# Patient Record
Sex: FEMALE | Race: WHITE | NOT HISPANIC OR LATINO
[De-identification: names, ages, dates, MRNs, and addresses within clinical notes are randomized per-mention and may not be internally consistent; named-entity substitution may affect disease eponyms.]

---

## 2017-01-19 ENCOUNTER — APPOINTMENT (OUTPATIENT)
Dept: CARDIOLOGY | Facility: CLINIC | Age: 72
End: 2017-01-19

## 2017-01-21 ENCOUNTER — TRANSCRIPTION ENCOUNTER (OUTPATIENT)
Age: 72
End: 2017-01-21

## 2017-05-22 ENCOUNTER — INPATIENT (INPATIENT)
Facility: HOSPITAL | Age: 72
LOS: 1 days | Discharge: HOME | DRG: 190 | End: 2017-05-24
Attending: INTERNAL MEDICINE | Admitting: INTERNAL MEDICINE
Payer: MEDICARE

## 2017-05-22 VITALS
HEART RATE: 115 BPM | WEIGHT: 93.04 LBS | RESPIRATION RATE: 23 BRPM | TEMPERATURE: 100 F | HEIGHT: 65 IN | DIASTOLIC BLOOD PRESSURE: 78 MMHG | OXYGEN SATURATION: 93 % | SYSTOLIC BLOOD PRESSURE: 174 MMHG

## 2017-05-22 DIAGNOSIS — J44.1 CHRONIC OBSTRUCTIVE PULMONARY DISEASE WITH (ACUTE) EXACERBATION: ICD-10-CM

## 2017-05-22 DIAGNOSIS — R50.9 FEVER, UNSPECIFIED: ICD-10-CM

## 2017-05-22 DIAGNOSIS — Z29.9 ENCOUNTER FOR PROPHYLACTIC MEASURES, UNSPECIFIED: ICD-10-CM

## 2017-05-22 DIAGNOSIS — J96.01 ACUTE RESPIRATORY FAILURE WITH HYPOXIA: ICD-10-CM

## 2017-05-22 DIAGNOSIS — I10 ESSENTIAL (PRIMARY) HYPERTENSION: ICD-10-CM

## 2017-05-22 DIAGNOSIS — K21.9 GASTRO-ESOPHAGEAL REFLUX DISEASE WITHOUT ESOPHAGITIS: ICD-10-CM

## 2017-05-22 LAB
ALBUMIN SERPL ELPH-MCNC: 2.8 G/DL — LOW (ref 3.3–5)
ALP SERPL-CCNC: 86 U/L — SIGNIFICANT CHANGE UP (ref 30–120)
ALT FLD-CCNC: 28 U/L DA — SIGNIFICANT CHANGE UP (ref 10–60)
ANION GAP SERPL CALC-SCNC: 8 MMOL/L — SIGNIFICANT CHANGE UP (ref 5–17)
APPEARANCE UR: CLEAR — SIGNIFICANT CHANGE UP
APTT BLD: 30.1 SEC — SIGNIFICANT CHANGE UP (ref 27.5–37.4)
AST SERPL-CCNC: 24 U/L — SIGNIFICANT CHANGE UP (ref 10–40)
BASE EXCESS BLDA CALC-SCNC: 2.1 MMOL/L — HIGH (ref -2–2)
BILIRUB SERPL-MCNC: 0.4 MG/DL — SIGNIFICANT CHANGE UP (ref 0.2–1.2)
BILIRUB UR-MCNC: NEGATIVE — SIGNIFICANT CHANGE UP
BLOOD GAS SOURCE: SIGNIFICANT CHANGE UP
BUN SERPL-MCNC: 17 MG/DL — SIGNIFICANT CHANGE UP (ref 7–23)
CALCIUM SERPL-MCNC: 9.3 MG/DL — SIGNIFICANT CHANGE UP (ref 8.4–10.5)
CHLORIDE SERPL-SCNC: 97 MMOL/L — SIGNIFICANT CHANGE UP (ref 96–108)
CO2 SERPL-SCNC: 30 MMOL/L — SIGNIFICANT CHANGE UP (ref 22–31)
COLOR SPEC: YELLOW — SIGNIFICANT CHANGE UP
CREAT SERPL-MCNC: 0.81 MG/DL — SIGNIFICANT CHANGE UP (ref 0.5–1.3)
DIFF PNL FLD: ABNORMAL
GLUCOSE SERPL-MCNC: 89 MG/DL — SIGNIFICANT CHANGE UP (ref 70–99)
GLUCOSE UR QL: NEGATIVE MG/DL — SIGNIFICANT CHANGE UP
HCO3 BLDA-SCNC: 27 MMOL/L — SIGNIFICANT CHANGE UP (ref 21–29)
HCT VFR BLD CALC: 42.5 % — SIGNIFICANT CHANGE UP (ref 34.5–45)
HGB BLD-MCNC: 14 G/DL — SIGNIFICANT CHANGE UP (ref 11.5–15.5)
HOROWITZ INDEX BLDA+IHG-RTO: 21 — SIGNIFICANT CHANGE UP
INR BLD: 1.19 RATIO — HIGH (ref 0.88–1.16)
KETONES UR-MCNC: ABNORMAL
LACTATE SERPL-SCNC: 1.3 MMOL/L — SIGNIFICANT CHANGE UP (ref 0.7–2)
LEUKOCYTE ESTERASE UR-ACNC: ABNORMAL
LIDOCAIN IGE QN: 72 U/L — LOW (ref 73–393)
LYMPHOCYTES # BLD AUTO: 23 % — SIGNIFICANT CHANGE UP (ref 13–44)
MAGNESIUM SERPL-MCNC: 1.6 MG/DL — SIGNIFICANT CHANGE UP (ref 1.6–2.6)
MCHC RBC-ENTMCNC: 28.2 PG — SIGNIFICANT CHANGE UP (ref 27–34)
MCHC RBC-ENTMCNC: 33 GM/DL — SIGNIFICANT CHANGE UP (ref 32–36)
MCV RBC AUTO: 85.4 FL — SIGNIFICANT CHANGE UP (ref 80–100)
MONOCYTES NFR BLD AUTO: 4 % — SIGNIFICANT CHANGE UP (ref 2–14)
NEUTROPHILS NFR BLD AUTO: 72 % — SIGNIFICANT CHANGE UP (ref 43–77)
NITRITE UR-MCNC: POSITIVE
PCO2 BLDA: 33 MMHG — SIGNIFICANT CHANGE UP (ref 32–46)
PH BLD: 7.49 — HIGH (ref 7.35–7.45)
PH UR: 6.5 — SIGNIFICANT CHANGE UP (ref 5–8)
PHOSPHATE SERPL-MCNC: 3.1 MG/DL — SIGNIFICANT CHANGE UP (ref 2.5–4.5)
PLATELET # BLD AUTO: 353 K/UL — SIGNIFICANT CHANGE UP (ref 150–400)
PO2 BLDA: 54 MMHG — LOW (ref 74–108)
POTASSIUM SERPL-MCNC: 3.8 MMOL/L — SIGNIFICANT CHANGE UP (ref 3.5–5.3)
POTASSIUM SERPL-SCNC: 3.8 MMOL/L — SIGNIFICANT CHANGE UP (ref 3.5–5.3)
PROT SERPL-MCNC: 6.2 G/DL — SIGNIFICANT CHANGE UP (ref 6–8.3)
PROT UR-MCNC: 30 MG/DL
PROTHROM AB SERPL-ACNC: 13 SEC — HIGH (ref 9.8–12.7)
RBC # BLD: 4.98 M/UL — SIGNIFICANT CHANGE UP (ref 3.8–5.2)
RBC # FLD: 13.9 % — SIGNIFICANT CHANGE UP (ref 10.3–14.5)
SAO2 % BLDA: 89 % — LOW (ref 92–96)
SODIUM SERPL-SCNC: 135 MMOL/L — SIGNIFICANT CHANGE UP (ref 135–145)
SP GR SPEC: 1.01 — SIGNIFICANT CHANGE UP (ref 1.01–1.02)
UROBILINOGEN FLD QL: NEGATIVE MG/DL — SIGNIFICANT CHANGE UP
WBC # BLD: 22.2 K/UL — HIGH (ref 3.8–10.5)
WBC # FLD AUTO: 22.2 K/UL — HIGH (ref 3.8–10.5)

## 2017-05-22 PROCEDURE — 99285 EMERGENCY DEPT VISIT HI MDM: CPT

## 2017-05-22 PROCEDURE — 93010 ELECTROCARDIOGRAM REPORT: CPT

## 2017-05-22 PROCEDURE — 71010: CPT | Mod: 26

## 2017-05-22 RX ORDER — IPRATROPIUM/ALBUTEROL SULFATE 18-103MCG
3 AEROSOL WITH ADAPTER (GRAM) INHALATION EVERY 4 HOURS
Qty: 0 | Refills: 0 | Status: DISCONTINUED | OUTPATIENT
Start: 2017-05-22 | End: 2017-05-24

## 2017-05-22 RX ORDER — BUDESONIDE AND FORMOTEROL FUMARATE DIHYDRATE 160; 4.5 UG/1; UG/1
2 AEROSOL RESPIRATORY (INHALATION)
Qty: 0 | Refills: 0 | Status: DISCONTINUED | OUTPATIENT
Start: 2017-05-22 | End: 2017-05-24

## 2017-05-22 RX ORDER — TIOTROPIUM BROMIDE 18 UG/1
2 CAPSULE ORAL; RESPIRATORY (INHALATION)
Qty: 0 | Refills: 0 | COMMUNITY

## 2017-05-22 RX ORDER — ZOLPIDEM TARTRATE 10 MG/1
1 TABLET ORAL
Qty: 0 | Refills: 0 | COMMUNITY

## 2017-05-22 RX ORDER — TIOTROPIUM BROMIDE 18 UG/1
1 CAPSULE ORAL; RESPIRATORY (INHALATION) DAILY
Qty: 0 | Refills: 0 | Status: DISCONTINUED | OUTPATIENT
Start: 2017-05-22 | End: 2017-05-24

## 2017-05-22 RX ORDER — AZTREONAM 2 G
1000 VIAL (EA) INJECTION EVERY 8 HOURS
Qty: 0 | Refills: 0 | Status: DISCONTINUED | OUTPATIENT
Start: 2017-05-22 | End: 2017-05-23

## 2017-05-22 RX ORDER — ACETAMINOPHEN 500 MG
650 TABLET ORAL ONCE
Qty: 0 | Refills: 0 | Status: COMPLETED | OUTPATIENT
Start: 2017-05-22 | End: 2017-05-22

## 2017-05-22 RX ORDER — BUDESONIDE AND FORMOTEROL FUMARATE DIHYDRATE 160; 4.5 UG/1; UG/1
2 AEROSOL RESPIRATORY (INHALATION)
Qty: 0 | Refills: 0 | COMMUNITY

## 2017-05-22 RX ORDER — ACETAMINOPHEN 500 MG
650 TABLET ORAL EVERY 6 HOURS
Qty: 0 | Refills: 0 | Status: DISCONTINUED | OUTPATIENT
Start: 2017-05-22 | End: 2017-05-24

## 2017-05-22 RX ORDER — SIMVASTATIN 20 MG/1
1 TABLET, FILM COATED ORAL
Qty: 0 | Refills: 0 | COMMUNITY

## 2017-05-22 RX ORDER — ENOXAPARIN SODIUM 100 MG/ML
30 INJECTION SUBCUTANEOUS EVERY 24 HOURS
Qty: 0 | Refills: 0 | Status: DISCONTINUED | OUTPATIENT
Start: 2017-05-22 | End: 2017-05-24

## 2017-05-22 RX ORDER — SODIUM CHLORIDE 9 MG/ML
1000 INJECTION INTRAMUSCULAR; INTRAVENOUS; SUBCUTANEOUS ONCE
Qty: 0 | Refills: 0 | Status: COMPLETED | OUTPATIENT
Start: 2017-05-22 | End: 2017-05-22

## 2017-05-22 RX ORDER — SIMVASTATIN 20 MG/1
10 TABLET, FILM COATED ORAL AT BEDTIME
Qty: 0 | Refills: 0 | Status: DISCONTINUED | OUTPATIENT
Start: 2017-05-22 | End: 2017-05-24

## 2017-05-22 RX ORDER — CEFTRIAXONE 500 MG/1
1 INJECTION, POWDER, FOR SOLUTION INTRAMUSCULAR; INTRAVENOUS ONCE
Qty: 0 | Refills: 0 | Status: DISCONTINUED | OUTPATIENT
Start: 2017-05-22 | End: 2017-05-22

## 2017-05-22 RX ORDER — ZOLPIDEM TARTRATE 10 MG/1
5 TABLET ORAL AT BEDTIME
Qty: 0 | Refills: 0 | Status: DISCONTINUED | OUTPATIENT
Start: 2017-05-22 | End: 2017-05-24

## 2017-05-22 RX ORDER — AMLODIPINE BESYLATE 2.5 MG/1
2.5 TABLET ORAL EVERY 24 HOURS
Qty: 0 | Refills: 0 | Status: DISCONTINUED | OUTPATIENT
Start: 2017-05-22 | End: 2017-05-24

## 2017-05-22 RX ORDER — AZITHROMYCIN 500 MG/1
500 TABLET, FILM COATED ORAL ONCE
Qty: 0 | Refills: 0 | Status: DISCONTINUED | OUTPATIENT
Start: 2017-05-22 | End: 2017-05-22

## 2017-05-22 RX ORDER — IPRATROPIUM/ALBUTEROL SULFATE 18-103MCG
3 AEROSOL WITH ADAPTER (GRAM) INHALATION EVERY 6 HOURS
Qty: 0 | Refills: 0 | Status: DISCONTINUED | OUTPATIENT
Start: 2017-05-22 | End: 2017-05-24

## 2017-05-22 RX ORDER — LACTOBACILLUS ACIDOPHILUS 100MM CELL
1 CAPSULE ORAL
Qty: 0 | Refills: 0 | Status: DISCONTINUED | OUTPATIENT
Start: 2017-05-22 | End: 2017-05-24

## 2017-05-22 RX ORDER — VANCOMYCIN HCL 1 G
1000 VIAL (EA) INTRAVENOUS ONCE
Qty: 0 | Refills: 0 | Status: COMPLETED | OUTPATIENT
Start: 2017-05-22 | End: 2017-05-22

## 2017-05-22 RX ORDER — AZTREONAM 2 G
1000 VIAL (EA) INJECTION ONCE
Qty: 0 | Refills: 0 | Status: COMPLETED | OUTPATIENT
Start: 2017-05-22 | End: 2017-05-22

## 2017-05-22 RX ORDER — FAMOTIDINE 10 MG/ML
20 INJECTION INTRAVENOUS DAILY
Qty: 0 | Refills: 0 | Status: DISCONTINUED | OUTPATIENT
Start: 2017-05-22 | End: 2017-05-24

## 2017-05-22 RX ADMIN — Medication 125 MILLIGRAM(S): at 18:00

## 2017-05-22 RX ADMIN — Medication 50 MILLIGRAM(S): at 17:55

## 2017-05-22 RX ADMIN — Medication 650 MILLIGRAM(S): at 18:01

## 2017-05-22 RX ADMIN — Medication 250 MILLIGRAM(S): at 18:25

## 2017-05-22 RX ADMIN — Medication 3 MILLILITER(S): at 20:25

## 2017-05-22 RX ADMIN — SODIUM CHLORIDE 1000 MILLILITER(S): 9 INJECTION INTRAMUSCULAR; INTRAVENOUS; SUBCUTANEOUS at 17:55

## 2017-05-22 RX ADMIN — SIMVASTATIN 10 MILLIGRAM(S): 20 TABLET, FILM COATED ORAL at 21:21

## 2017-05-22 NOTE — H&P ADULT - PROBLEM SELECTOR PLAN 3
Patient with questionable pneumonia.  Will place on IV antibiotics.  Follow Chest x-ray results.  Further management as per patient's clinical course.

## 2017-05-22 NOTE — H&P ADULT - PMH
Chronic obstructive pulmonary disease, unspecified COPD type    Essential hypertension    Gastroesophageal reflux disease, esophagitis presence not specified    Hyperlipidemia, unspecified hyperlipidemia type

## 2017-05-22 NOTE — H&P ADULT - NSHPSOCIALHISTORY_GEN_ALL_CORE
, lives with .  Quit smoking 2 years ago.   Smoked 3/4 pack per day for more than 50 years.   Denies any alcohol abuse.

## 2017-05-22 NOTE — ED PROVIDER NOTE - CONSTITUTIONAL, MLM
normal... thin, awake, alert, oriented to person, place, time/situation and in no apparent distress.

## 2017-05-22 NOTE — H&P ADULT - NSHPLABSRESULTS_GEN_ALL_CORE
14.0   22.2  )-----------( 353      ( 22 May 2017 17:44 )             42.5     05-22    135  |  97  |  17  ----------------------------<  89  3.8   |  30  |  0.81    Ca    9.3      22 May 2017 17:44    TPro  6.2  /  Alb  2.8<L>  /  TBili  0.4  /  DBili  x   /  AST  24  /  ALT  28  /  AlkPhos  86  05-22    CAPILLARY BLOOD GLUCOSE    PT/INR - ( 22 May 2017 17:44 )   PT: 13.0 sec;   INR: 1.19 ratio         PTT - ( 22 May 2017 17:44 )  PTT:30.1 sec    ABG - ( 22 May 2017 18:11 )  pH: x     /  pCO2: 33    /  pO2: 54    / HCO3: 27    / Base Excess: 2.1   /  SaO2: 89

## 2017-05-22 NOTE — ED PROVIDER NOTE - DETAILS:
Singh Chance D.O. - The scribe's documentation has been prepared under my direction and personally reviewed by me in its entirety. I confirm that the note above accurately reflects all work, treatment, procedures, and medical decision making performed by me.

## 2017-05-22 NOTE — H&P ADULT - HISTORY OF PRESENT ILLNESS
72-year-old female who has past medical history of COPD, hypertension, GERD, who has been having increasing shortness of breath for last few weeks.  Patient was treated as outpatient with oral steroids and nebulizer.  As she continued to have increasing shortness of breath, she was seen in Dr. Andrade's office today.  Patient was noted to have fever.  She was also noted to have COPD exacerbation.  Patient was advised to come to the emergency room for possible admission.    Patient denies any associated chest pain or chest pressure.  She denies any orthopnea or PND.  She denies any nausea or vomiting.    Patient states she has a cough that's productive of small amount of sputum.  She denies any dizziness or lightheadedness. Patient complains of being weak and tired.

## 2017-05-22 NOTE — ED ADULT NURSE REASSESSMENT NOTE - COMFORT CARE
warm blanket provided/assisted to bathroom/ambulated to bathroom/treatment delay explained/wait time explained

## 2017-05-22 NOTE — H&P ADULT - NSHPREVIEWOFSYSTEMS_GEN_ALL_CORE
REVIEW OF SYSTEMS:  CONSTITUTIONAL: + fever, + fatigue  EYES: No eye pain, visual disturbances, or discharge  ENMT:  No difficulty hearing, tinnitus, vertigo; No sinus or throat pain  NECK: No pain or stiffness  BREASTS: No pain, masses, or nipple discharge  RESPIRATORY: + cough,+ wheezing, + shortness of breath  CARDIOVASCULAR: No chest pain, palpitations, dizziness, or leg swelling  GASTROINTESTINAL: No abdominal or epigastric pain. No nausea, vomiting, or hematemesis; No diarrhea or constipation. No melena or hematochezia.  GENITOURINARY: No dysuria, frequency, hematuria, or incontinence  NEUROLOGICAL: No headaches, memory loss, loss of strength, numbness, or tremors  SKIN: No itching, burning, rashes, or lesions   LYMPH NODES: No enlarged glands  ENDOCRINE: No heat or cold intolerance; No hair loss; No polydipsia or polyuria  MUSCULOSKELETAL: No joint pain or swelling; No muscle, back, or extremity pain  PSYCHIATRIC: No depression, anxiety, mood swings, or difficulty sleeping  HEME/LYMPH: No easy bruising, or bleeding gums  ALLERGY AND IMMUNOLOGIC: No hives or eczema

## 2017-05-22 NOTE — H&P ADULT - ASSESSMENT
72-year-old female who has past medical history of COPD, hypertension, GERD, with COPD exacerbation, respiratory failure and ? pneumonia. Patient has failed out patient treatment and is being admitted for further management.

## 2017-05-22 NOTE — ED PROVIDER NOTE - NS ED MD SCRIBE ATTENDING SCRIBE SECTIONS
PAST MEDICAL/SURGICAL/SOCIAL HISTORY/HISTORY OF PRESENT ILLNESS/VITAL SIGNS( Pullset)/PHYSICAL EXAM/DISPOSITION/REVIEW OF SYSTEMS

## 2017-05-22 NOTE — ED ADULT NURSE REASSESSMENT NOTE - NS ED NURSE REASSESS COMMENT FT1
1900 Report given to RN Sayu. No acute respiratory distress noted.
1815 Blood drawn and sent to lab, including Blood c/s x 2 and lactate level. IV fluids with NS started  Started on IV antibiotics as per MD order. ABG done,. Placed on O2 3 L / min nasal cannula   after results was obtained. Pt admitted, bed assignment pending.

## 2017-05-22 NOTE — ED PROVIDER NOTE - OBJECTIVE STATEMENT
71 y/o F pt c/o shortness of breath, onset 1 month ago. Also c/o fever, onset today. Also c/o cough, secondary to COPD diagnosis. Denies chest pain, abdominal pain, or calf pain. No other complaints at this time. 73 y/o F pt c/o shortness of breath, onset 1 month ago. Also c/o fever, onset today. Also c/o cough, secondary to COPD diagnosis. Denies chest pain, abdominal pain, or calf pain. No other complaints at this time. Sent from Dr. Andrade's office for admission, had CXR that showed pneumonia.

## 2017-05-22 NOTE — H&P ADULT - PROBLEM SELECTOR PLAN 4
Will place patient on Solumedrol, Duonebs.  Continue patient on Symbicort and Spiriva.  Pulmonary consult.   Further management as per patient's clinical course.

## 2017-05-22 NOTE — H&P ADULT - NSHPPHYSICALEXAM_GEN_ALL_CORE
Vital Signs Last 24 Hrs  T(C): 37.2, Max: 37.6 (05-22 @ 16:42)  T(F): 99, Max: 99.6 (05-22 @ 16:42)  HR: 93 (93 - 115)  BP: 160/64 (135/56 - 174/78)  BP(mean): --  RR: 20 (20 - 23)  SpO2: 97% (93% - 97%)    PHYSICAL EXAM:  GENERAL: well-groomed, thin built, mild to moderate resp discomfort.   HEAD:  Atraumatic, Normocephalic  EYES: EOMI, PERRLA, conjunctiva and sclera clear  ENMT: No tonsillar erythema, exudates, or enlargement; Moist mucous membranes, Good dentition, No lesions  NECK: Supple, No JVD, Normal thyroid  CHEST/LUNG: B/L exp wheezes +, occasional conducted sounds +. ? rhonchi at bases.   HEART: Regular rate and rhythm; No murmurs, rubs, or gallops  ABDOMEN: Soft, Nontender, Nondistended; Bowel sounds present  EXTREMITIES:  2+ Peripheral Pulses, No clubbing, cyanosis, or edema  MS: No joint swelling or deformity.   LYMPH: No lymphadenopathy noted  SKIN: No rashes or lesions  NERVOUS SYSTEM:  Motor Strength 4/5 B/L upper and lower extremities; DTRs 2+ intact and symmetric  PSYCH:  Alert & Oriented X3, anxious looking. .

## 2017-05-22 NOTE — H&P ADULT - PROBLEM SELECTOR PLAN 1
Patient with acute respiratory failure with hypoxemia, POA.  Most likely due to COPD Exacerbation +/- Pneumonia.   Will place patient on oxygen supplementation and monitor pulse ox.  Pulmonary consult called.   Further management as per patient's clinical course.

## 2017-05-22 NOTE — H&P ADULT - PROBLEM SELECTOR PLAN 5
Will stop patient's HCTZ and place patient on low dose Norvasc withholding parameters.  Monitor blood pressure per protocol.

## 2017-05-22 NOTE — H&P ADULT - PROBLEM SELECTOR PLAN 2
Patient with fever possibly due to pneumonia vs bronchitis.   Chest x-ray pending.   Will do pan cultures and place on empiric antibiotics.   ID consult called.   Follow culture data.  Further management as per patient's clinical course.

## 2017-05-23 LAB
CHOLEST SERPL-MCNC: 149 MG/DL — SIGNIFICANT CHANGE UP (ref 10–199)
GRAM STN FLD: SIGNIFICANT CHANGE UP
HBA1C BLD-MCNC: 5.6 % — SIGNIFICANT CHANGE UP (ref 4–5.6)
HDLC SERPL-MCNC: 32 MG/DL — LOW (ref 40–125)
LIPID PNL WITH DIRECT LDL SERPL: 99 MG/DL — SIGNIFICANT CHANGE UP
PROCALCITONIN SERPL-MCNC: 0.09 NG/ML — HIGH (ref 0–0.05)
SPECIMEN SOURCE: SIGNIFICANT CHANGE UP
T3 SERPL-MCNC: 102 NG/DL — SIGNIFICANT CHANGE UP (ref 80–200)
T4 AB SER-ACNC: 8.2 UG/DL — SIGNIFICANT CHANGE UP (ref 4.6–12)
TOTAL CHOLESTEROL/HDL RATIO MEASUREMENT: 4.7 RATIO — SIGNIFICANT CHANGE UP (ref 3.3–7.1)
TRIGL SERPL-MCNC: 88 MG/DL — SIGNIFICANT CHANGE UP (ref 10–149)
TSH SERPL-MCNC: 2.65 UIU/ML — SIGNIFICANT CHANGE UP (ref 0.27–4.2)

## 2017-05-23 PROCEDURE — 71275 CT ANGIOGRAPHY CHEST: CPT | Mod: 26

## 2017-05-23 RX ORDER — CEFTRIAXONE 500 MG/1
INJECTION, POWDER, FOR SOLUTION INTRAMUSCULAR; INTRAVENOUS
Qty: 0 | Refills: 0 | Status: DISCONTINUED | OUTPATIENT
Start: 2017-05-23 | End: 2017-05-23

## 2017-05-23 RX ORDER — CEFTRIAXONE 500 MG/1
1 INJECTION, POWDER, FOR SOLUTION INTRAMUSCULAR; INTRAVENOUS ONCE
Qty: 0 | Refills: 0 | Status: DISCONTINUED | OUTPATIENT
Start: 2017-05-23 | End: 2017-05-23

## 2017-05-23 RX ORDER — CEFTRIAXONE 500 MG/1
1 INJECTION, POWDER, FOR SOLUTION INTRAMUSCULAR; INTRAVENOUS EVERY 24 HOURS
Qty: 0 | Refills: 0 | Status: COMPLETED | OUTPATIENT
Start: 2017-05-24 | End: 2017-05-24

## 2017-05-23 RX ORDER — CEFTRIAXONE 500 MG/1
INJECTION, POWDER, FOR SOLUTION INTRAMUSCULAR; INTRAVENOUS
Qty: 0 | Refills: 0 | Status: COMPLETED | OUTPATIENT
Start: 2017-05-23 | End: 2017-05-24

## 2017-05-23 RX ORDER — CEFTRIAXONE 500 MG/1
1 INJECTION, POWDER, FOR SOLUTION INTRAMUSCULAR; INTRAVENOUS ONCE
Qty: 0 | Refills: 0 | Status: COMPLETED | OUTPATIENT
Start: 2017-05-23 | End: 2017-05-23

## 2017-05-23 RX ADMIN — Medication 3 MILLILITER(S): at 14:06

## 2017-05-23 RX ADMIN — Medication 3 MILLILITER(S): at 20:10

## 2017-05-23 RX ADMIN — Medication 50 MILLIGRAM(S): at 09:23

## 2017-05-23 RX ADMIN — Medication 650 MILLIGRAM(S): at 18:01

## 2017-05-23 RX ADMIN — Medication 40 MILLIGRAM(S): at 05:24

## 2017-05-23 RX ADMIN — AMLODIPINE BESYLATE 2.5 MILLIGRAM(S): 2.5 TABLET ORAL at 05:24

## 2017-05-23 RX ADMIN — Medication 100 MILLIGRAM(S): at 11:31

## 2017-05-23 RX ADMIN — Medication 650 MILLIGRAM(S): at 18:00

## 2017-05-23 RX ADMIN — TIOTROPIUM BROMIDE 1 CAPSULE(S): 18 CAPSULE ORAL; RESPIRATORY (INHALATION) at 00:35

## 2017-05-23 RX ADMIN — Medication 100 MILLIGRAM(S): at 18:00

## 2017-05-23 RX ADMIN — Medication 40 MILLIGRAM(S): at 13:55

## 2017-05-23 RX ADMIN — BUDESONIDE AND FORMOTEROL FUMARATE DIHYDRATE 2 PUFF(S): 160; 4.5 AEROSOL RESPIRATORY (INHALATION) at 19:00

## 2017-05-23 RX ADMIN — Medication 40 MILLIGRAM(S): at 22:09

## 2017-05-23 RX ADMIN — Medication 3 MILLILITER(S): at 07:37

## 2017-05-23 RX ADMIN — Medication 50 MILLIGRAM(S): at 00:13

## 2017-05-23 RX ADMIN — SIMVASTATIN 10 MILLIGRAM(S): 20 TABLET, FILM COATED ORAL at 22:09

## 2017-05-23 RX ADMIN — BUDESONIDE AND FORMOTEROL FUMARATE DIHYDRATE 2 PUFF(S): 160; 4.5 AEROSOL RESPIRATORY (INHALATION) at 00:13

## 2017-05-23 RX ADMIN — ZOLPIDEM TARTRATE 5 MILLIGRAM(S): 10 TABLET ORAL at 00:12

## 2017-05-23 RX ADMIN — BUDESONIDE AND FORMOTEROL FUMARATE DIHYDRATE 2 PUFF(S): 160; 4.5 AEROSOL RESPIRATORY (INHALATION) at 06:41

## 2017-05-23 RX ADMIN — ZOLPIDEM TARTRATE 5 MILLIGRAM(S): 10 TABLET ORAL at 01:13

## 2017-05-23 RX ADMIN — CEFTRIAXONE 100 GRAM(S): 500 INJECTION, POWDER, FOR SOLUTION INTRAMUSCULAR; INTRAVENOUS at 11:31

## 2017-05-23 NOTE — PROGRESS NOTE ADULT - ASSESSMENT
72-year-old female who has past medical history of COPD, hypertension, GERD, with COPD exacerbation, respiratory failure and ? pneumonia. Patient has failed out patient treatment and is being admitted for further management. Improving.

## 2017-05-23 NOTE — PROGRESS NOTE ADULT - ASSESSMENT
72-year-old female who has past medical history of COPD, hypertension, GERD, with COPD exacerbation, respiratory failure and ? pneumonia. Patient has failed out patient treatment and is being admitted for further management.   Improved today.

## 2017-05-23 NOTE — PROGRESS NOTE ADULT - PROBLEM SELECTOR PLAN 1
Patient with acute respiratory failure with hypoxemia, POA.  Most likely due to COPD Exacerbation +/- Pneumonia.   Will place patient on oxygen supplementation and monitor pulse ox.  Steroids.  Pulmonary consult called.   Further management as per patient's clinical course.

## 2017-05-23 NOTE — PROGRESS NOTE ADULT - PROBLEM SELECTOR PLAN 3
Patient with fever possibly due to bronchitis.   Continue Antibiotics as per ID.  Follow culture data.

## 2017-05-23 NOTE — PROGRESS NOTE ADULT - PROBLEM SELECTOR PLAN 2
Improving.   Continue patient on Solumedrol, Duonebs.  Continue patient on Symbicort and Spiriva.  Pulmonary consult noted.    Further management as per patient's clinical course.

## 2017-05-23 NOTE — CONSULT NOTE ADULT - SUBJECTIVE AND OBJECTIVE BOX
Infectious Diseases Consult by Mackenzie Dorman MD    Reason for Consult : Leukocytosis with possible pneumonia    HPI:  72-year-old female ex smoker for over 50 years quit 2 years ago, who has past medical history of COPD, hypertension, GERD, who has been having increasing shortness of breath for last few weeks.  Patient was treated as outpatient with oral steroids and nebulizer.  As she continued to have increasing shortness of breath, she was seen in Dr. Andrade's office today.  Patient was noted to have fever.  She was also noted to have COPD exacerbation.  Patient was advised to come to the emergency room for possible admission. She had seen another pulmonologist 1 week ago had lab work done  showed leukocytosis . She has productive cough      Patient denies any associated chest pain or chest pressure.  She denies any orthopnea or PND.  She denies any nausea or vomiting. She denies any travel or sick contact.     Patient states she has a cough that's productive of small amount of sputum.  She denies any dizziness or lightheadedness. Patient complains of being weak and tired. She has weight loss. She has many allergies and side effects to antibiotics . But claims she has taken Doxycyline,  Amoxil , Zithromax     Past Medical & Surgical Hx:  PAST MEDICAL & SURGICAL HISTORY:  Gastroesophageal reflux disease, esophagitis presence not specified  Hyperlipidemia, unspecified hyperlipidemia type  Essential hypertension  Chronic obstructive pulmonary disease, unspecified COPD type  No significant past surgical history      Social History-- , retired   EtOH: denies   Tobacco: smoked over 1 PPD for over 50 years, quit 2 years ago   Drug Use: denies     FAMILY HISTORY:  No pertinent family history in first degree relatives      Allergies    amoxicillin (Unknown)  Augmentin (Unknown)  Bactrim (Unknown)  Cipro (Unknown)  Levaquin (Unknown)  tetracycline (Unknown)    Intolerances    Home/ Out patient  Medications :   Outpatient Medication Status not yet specified  · 	Spiriva Respimat 2.5 mcg/inh inhalation aerosol: 2 puff(s) inhaled once a day, Last Dose Taken:    · 	zolpidem 10 mg oral tablet: 1 tab(s) orally once a day (at bedtime), Last Dose Taken:    · 	simvastatin 10 mg oral tablet: 1 tab(s) orally once a day (at bedtime), Last Dose Taken:    · 	Symbicort 160 mcg-4.5 mcg/inh inhalation aerosol: 2 puff(s) inhaled 2 times a day, Last Dose Taken:             · 	hydroCHLOROthiazide 12.5 mg oral tablet: 1 tab(s) orally once a day, Last Dose Taken:      Current Inpatient Medications :    ANTIBIOTICS:   aztreonam  IVPB 1000milliGRAM(s) IV Intermittent every 8 hours      OTHER RELEVANT MEDICATIONS :  ALBUTerol/ipratropium for Nebulization 3milliLiter(s) Nebulizer every 6 hours  methylPREDNISolone sodium succinate Injectable 40milliGRAM(s) IV Push every 8 hours  enoxaparin Injectable 30milliGRAM(s) SubCutaneous every 24 hours  acetaminophen   Tablet 650milliGRAM(s) Oral every 6 hours PRN  acetaminophen   Tablet. 650milliGRAM(s) Oral every 6 hours PRN  ALBUTerol/ipratropium for Nebulization 3milliLiter(s) Nebulizer every 4 hours PRN  amLODIPine   Tablet 2.5milliGRAM(s) Oral every 24 hours  simvastatin 10milliGRAM(s) Oral at bedtime  zolpidem 5milliGRAM(s) Oral at bedtime PRN  zolpidem 5milliGRAM(s) Oral at bedtime PRN  tiotropium 18 MICROgram(s) Capsule 1Capsule(s) Inhalation daily  buDESOnide 160 MICROgram(s)/formoterol 4.5 MICROgram(s) Inhaler 2Puff(s) Inhalation two times a day  famotidine    Tablet 20milliGRAM(s) Oral daily  aluminum hydroxide/magnesium hydroxide/simethicone Suspension 30milliLiter(s) Oral every 4 hours PRN    ROS:  CONSTITUTIONAL:  Positive  fever , chills, feels weak, poor appetite  EYES:  Negative  blurry vision or double vision  CARDIOVASCULAR:  Negative for chest pain or palpitations  RESPIRATORY:  Positive  for cough and SOB   GASTROINTESTINAL:  Negative for nausea, vomiting, diarrhea, constipation, or abdominal pain  GENITOURINARY:  Negative frequency, urgency , dysuria or hematuria   NEUROLOGIC:  No headache, confusion, dizziness, lightheadedness  All other systems were reviewed and are negative          I&O's Detail    I & Os for current day (as of 23 May 2017 09:43)  =============================================  IN:    Sodium Chloride 0.9% IV Bolus: 1000 ml    Total IN: 1000 ml  ---------------------------------------------  OUT:    Voided: 600 ml    Total OUT: 600 ml  ---------------------------------------------  Total NET: 400 ml      Physical Exam:  Vital Signs Last 24 Hrs  T(C): 36.4, Max: 37.6 ( @ 16:42)  T(F): 97.5, Max: 99.6 ( @ 16:42)  HR: 86 (71 - 115)  BP: 117/68 (117/68 - 174/78)  BP(mean): --  RR: 22 (20 - 24)  SpO2: 98% (93% - 98%)  Height (cm): 165.1 ( @ 16:42)  Weight (kg): 42.2 ( @ 16:42)  BMI (kg/m2): 15.5 ( @ 16:42)  BSA (m2): 1.43 ( @ 16:42)    GEN: NAD, pleasant  HEENT: normocephalic and atraumatic. EOMI. IGGY. Conjunctival  & sclera clear, Moist mucosa. Clear Posterior pharynx.  NECK: Supple. No carotid bruits.  No lymphadenopathy or thyromegaly.  LUNGS: Coarse breath sounds on auscultation.  HEART: Regular rate and rhythm without murmur.  ABDOMEN: Soft, nontender, and nondistended.  Positive bowel sounds.  No hepatosplenomegaly was noted.  EXTREMITIES: Without any cyanosis, clubbing, rash, lesions or edema. Peripheral pulses  2 +  NEUROLOGIC: A & O x 3 , No focal neurological deficits   SKIN: No ulceration or induration present.      Labs:       135  |  97  |  17  ----------------------------<  89  3.8   |  30  |  0.81    Ca    9.3      22 May 2017 17:44  Phos  3.1       Mg     1.6         TPro  6.2  /  Alb  2.8<L>  /  TBili  0.4  /  DBili  x   /  AST  24  /  ALT  28  /  AlkPhos  86                            14.0   22.2  )-----------( 353      ( 22 May 2017 17:44 )             42.5     Lactate, Blood (17 @ 17:44)    Lactate, Blood: 1.3 mmol/L        PT/INR - ( 22 May 2017 17:44 )   PT: 13.0 sec;   INR: 1.19 ratio         PTT - ( 22 May 2017 17:44 )  PTT:30.1 sec  Urinalysis Basic - ( 22 May 2017 20:07 )    Color: Yellow / Appearance: Clear / S.015 / pH: x  Gluc: x / Ketone: Trace  / Bili: Negative / Urobili: Negative mg/dL   Blood: x / Protein: 30 mg/dL / Nitrite: Positive   Leuk Esterase: Small / RBC: 0-2 /HPF / WBC 0-2   Sq Epi: x / Non Sq Epi: Occasional / Bacteria: Occasional      LIVER FUNCTIONS - ( 22 May 2017 17:44 )  Alb: 2.8 g/dL / Pro: 6.2 g/dL / ALK PHOS: 86 U/L / ALT: 28 U/L DA / AST: 24 U/L / GGT: x               ABG - ( 22 May 2017 18:11 )  pH: x     /  pCO2: 33    /  pO2: 54    / HCO3: 27    / Base Excess: 2.1   /  SaO2: 89          RADIOLOGY & ADDITIONAL STUDIES:  EXAM:  CHEST (SINGLE AP PA)                        PROCEDURE DATE:  2017      INTERPRETATION:  Clinical information: Fever and shortness of breath    No prior studies present for comparison    Frontal view of the chest    Lungs appear hyperlucent, an emphysematous change. No acute lobar   consolidation vascular congestion or effusion. Heart size within normal   limits. Hilar regions, mediastinal contours, osseous structures intact.    IMPRESSION:    See above report      Problem/Plan - 1:  ·  Problem: Acute respiratory failure with hypoxia.  Plan:Plan: Patient with acute respiratory failure with hypoxemia, POA.  Most likely due to COPD Exacerbation +/- Pneumonia.   Continue patient on Symbicort and Spiriva.  Will place patient on oxygen supplementation and monitor pulse ox.  Pulmonary consult noted   Further management as per patient's clinical course..     Problem/Plan - 2:  ·  Problem: Fever, unspecified fever cause.  Plan:Plan: Patient with leukocytosis and fever possibly due to pneumonia vs bronchitis.   Chest x-ray is unremarkable   will change antibiotics to Rocephin and Doxycycline she has tolerated it in past   Follow culture data. and trend CBC  consider ct scan of chest with IV contrast due to severe SOB rule ou VTE  Further management as per patient's clinical course..       Problem/Plan - 3:  ·  Problem: Essential hypertension.  Plan:Plan: Will stop patient's HCTZ and place patient on low dose Norvasc withholding parameters.  Monitor blood pressure per protocol..     Problem/Plan - 4:  Problem: Gastroesophageal reflux disease, esophagitis presence not specified. Plan:Plan: Pepcid for GERD..    Continue with present regime .  Approptiate use of antibiotics and adverse effects reviewed.      I have discussed the above plan of care with patient in detail. She expressed understanding of the treatment plan . Risks, benefits and alternatives discussed in detail. I have asked if she has  any questions or concerns and appropriately addressed them to the best of my ability .      > 45 minutes spent in direct patient care reviewing  the notes, lab data/ imaging , discussion with multidisciplinary team. All questions were addressed and answered to the best of my capacity .    Thank you for allowing me to participate in the care of your patient .      Mackenzie Dorman MD  [phone]

## 2017-05-23 NOTE — PROGRESS NOTE ADULT - PROBLEM SELECTOR PLAN 1
Patient with acute respiratory failure with hypoxemia, POA.  Improving.   Ruled out for PE.  Most likely due to COPD exacerbation.

## 2017-05-24 ENCOUNTER — TRANSCRIPTION ENCOUNTER (OUTPATIENT)
Age: 72
End: 2017-05-24

## 2017-05-24 VITALS
RESPIRATION RATE: 24 BRPM | DIASTOLIC BLOOD PRESSURE: 73 MMHG | HEART RATE: 100 BPM | OXYGEN SATURATION: 92 % | TEMPERATURE: 99 F | SYSTOLIC BLOOD PRESSURE: 153 MMHG

## 2017-05-24 LAB
ANION GAP SERPL CALC-SCNC: 7 MMOL/L — SIGNIFICANT CHANGE UP (ref 5–17)
BASOPHILS # BLD AUTO: 0 K/UL — SIGNIFICANT CHANGE UP (ref 0–0.2)
BASOPHILS NFR BLD AUTO: 0.2 % — SIGNIFICANT CHANGE UP (ref 0–2)
BUN SERPL-MCNC: 16 MG/DL — SIGNIFICANT CHANGE UP (ref 7–23)
CALCIUM SERPL-MCNC: 9.4 MG/DL — SIGNIFICANT CHANGE UP (ref 8.4–10.5)
CHLORIDE SERPL-SCNC: 104 MMOL/L — SIGNIFICANT CHANGE UP (ref 96–108)
CO2 SERPL-SCNC: 30 MMOL/L — SIGNIFICANT CHANGE UP (ref 22–31)
CREAT SERPL-MCNC: 0.86 MG/DL — SIGNIFICANT CHANGE UP (ref 0.5–1.3)
CRP SERPL-MCNC: 2.9 MG/DL — HIGH (ref 0–0.4)
CULTURE RESULTS: SIGNIFICANT CHANGE UP
EOSINOPHIL # BLD AUTO: 0 K/UL — SIGNIFICANT CHANGE UP (ref 0–0.5)
EOSINOPHIL NFR BLD AUTO: 0 % — SIGNIFICANT CHANGE UP (ref 0–6)
ERYTHROCYTE [SEDIMENTATION RATE] IN BLOOD: 17 MM/HR — SIGNIFICANT CHANGE UP (ref 0–20)
GLUCOSE SERPL-MCNC: 114 MG/DL — HIGH (ref 70–99)
HCT VFR BLD CALC: 39.8 % — SIGNIFICANT CHANGE UP (ref 34.5–45)
HGB BLD-MCNC: 13.1 G/DL — SIGNIFICANT CHANGE UP (ref 11.5–15.5)
LYMPHOCYTES # BLD AUTO: 18.6 % — SIGNIFICANT CHANGE UP (ref 13–44)
LYMPHOCYTES # BLD AUTO: 4.3 K/UL — HIGH (ref 1–3.3)
MCHC RBC-ENTMCNC: 28.3 PG — SIGNIFICANT CHANGE UP (ref 27–34)
MCHC RBC-ENTMCNC: 32.9 GM/DL — SIGNIFICANT CHANGE UP (ref 32–36)
MCV RBC AUTO: 86.1 FL — SIGNIFICANT CHANGE UP (ref 80–100)
MONOCYTES # BLD AUTO: 0.4 K/UL — SIGNIFICANT CHANGE UP (ref 0–0.9)
MONOCYTES NFR BLD AUTO: 1.8 % — LOW (ref 2–14)
MRSA PCR RESULT.: SIGNIFICANT CHANGE UP
NEUTROPHILS # BLD AUTO: 18.3 K/UL — HIGH (ref 1.8–7.4)
NEUTROPHILS NFR BLD AUTO: 79.5 % — HIGH (ref 43–77)
PLATELET # BLD AUTO: 404 K/UL — HIGH (ref 150–400)
POTASSIUM SERPL-MCNC: 3.8 MMOL/L — SIGNIFICANT CHANGE UP (ref 3.5–5.3)
POTASSIUM SERPL-SCNC: 3.8 MMOL/L — SIGNIFICANT CHANGE UP (ref 3.5–5.3)
RBC # BLD: 4.63 M/UL — SIGNIFICANT CHANGE UP (ref 3.8–5.2)
RBC # FLD: 14.1 % — SIGNIFICANT CHANGE UP (ref 10.3–14.5)
S AUREUS DNA NOSE QL NAA+PROBE: SIGNIFICANT CHANGE UP
SODIUM SERPL-SCNC: 141 MMOL/L — SIGNIFICANT CHANGE UP (ref 135–145)
SPECIMEN SOURCE: SIGNIFICANT CHANGE UP
WBC # BLD: 23 K/UL — HIGH (ref 3.8–10.5)
WBC # FLD AUTO: 23 K/UL — HIGH (ref 3.8–10.5)

## 2017-05-24 PROCEDURE — 94640 AIRWAY INHALATION TREATMENT: CPT

## 2017-05-24 PROCEDURE — 87641 MR-STAPH DNA AMP PROBE: CPT

## 2017-05-24 PROCEDURE — 71045 X-RAY EXAM CHEST 1 VIEW: CPT

## 2017-05-24 PROCEDURE — 87086 URINE CULTURE/COLONY COUNT: CPT

## 2017-05-24 PROCEDURE — 84145 PROCALCITONIN (PCT): CPT

## 2017-05-24 PROCEDURE — 87640 STAPH A DNA AMP PROBE: CPT

## 2017-05-24 PROCEDURE — 83036 HEMOGLOBIN GLYCOSYLATED A1C: CPT

## 2017-05-24 PROCEDURE — 85610 PROTHROMBIN TIME: CPT

## 2017-05-24 PROCEDURE — 83690 ASSAY OF LIPASE: CPT

## 2017-05-24 PROCEDURE — 84480 ASSAY TRIIODOTHYRONINE (T3): CPT

## 2017-05-24 PROCEDURE — 96374 THER/PROPH/DIAG INJ IV PUSH: CPT

## 2017-05-24 PROCEDURE — 82803 BLOOD GASES ANY COMBINATION: CPT

## 2017-05-24 PROCEDURE — 71275 CT ANGIOGRAPHY CHEST: CPT

## 2017-05-24 PROCEDURE — 80053 COMPREHEN METABOLIC PANEL: CPT

## 2017-05-24 PROCEDURE — 99285 EMERGENCY DEPT VISIT HI MDM: CPT | Mod: 25

## 2017-05-24 PROCEDURE — 84100 ASSAY OF PHOSPHORUS: CPT

## 2017-05-24 PROCEDURE — 87070 CULTURE OTHR SPECIMN AEROBIC: CPT

## 2017-05-24 PROCEDURE — 85730 THROMBOPLASTIN TIME PARTIAL: CPT

## 2017-05-24 PROCEDURE — 84436 ASSAY OF TOTAL THYROXINE: CPT

## 2017-05-24 PROCEDURE — 84443 ASSAY THYROID STIM HORMONE: CPT

## 2017-05-24 PROCEDURE — 81001 URINALYSIS AUTO W/SCOPE: CPT

## 2017-05-24 PROCEDURE — 93005 ELECTROCARDIOGRAM TRACING: CPT

## 2017-05-24 PROCEDURE — 85652 RBC SED RATE AUTOMATED: CPT

## 2017-05-24 PROCEDURE — 86140 C-REACTIVE PROTEIN: CPT

## 2017-05-24 PROCEDURE — 83735 ASSAY OF MAGNESIUM: CPT

## 2017-05-24 PROCEDURE — 87040 BLOOD CULTURE FOR BACTERIA: CPT

## 2017-05-24 PROCEDURE — 85027 COMPLETE CBC AUTOMATED: CPT

## 2017-05-24 PROCEDURE — 96375 TX/PRO/DX INJ NEW DRUG ADDON: CPT

## 2017-05-24 PROCEDURE — 83605 ASSAY OF LACTIC ACID: CPT

## 2017-05-24 PROCEDURE — 80061 LIPID PANEL: CPT

## 2017-05-24 PROCEDURE — 80048 BASIC METABOLIC PNL TOTAL CA: CPT

## 2017-05-24 RX ORDER — LACTOBACILLUS ACIDOPHILUS 100MM CELL
1 CAPSULE ORAL
Qty: 10 | Refills: 0 | OUTPATIENT
Start: 2017-05-24 | End: 2017-05-29

## 2017-05-24 RX ORDER — AMLODIPINE BESYLATE 2.5 MG/1
1 TABLET ORAL
Qty: 30 | Refills: 0 | OUTPATIENT
Start: 2017-05-24 | End: 2017-06-23

## 2017-05-24 RX ORDER — FAMOTIDINE 10 MG/ML
1 INJECTION INTRAVENOUS
Qty: 10 | Refills: 0 | OUTPATIENT
Start: 2017-05-24 | End: 2017-06-03

## 2017-05-24 RX ADMIN — Medication 40 MILLIGRAM(S): at 08:52

## 2017-05-24 RX ADMIN — Medication 100 MILLIGRAM(S): at 08:55

## 2017-05-24 RX ADMIN — TIOTROPIUM BROMIDE 1 CAPSULE(S): 18 CAPSULE ORAL; RESPIRATORY (INHALATION) at 08:57

## 2017-05-24 RX ADMIN — Medication 3 MILLILITER(S): at 07:44

## 2017-05-24 RX ADMIN — CEFTRIAXONE 100 GRAM(S): 500 INJECTION, POWDER, FOR SOLUTION INTRAMUSCULAR; INTRAVENOUS at 11:30

## 2017-05-24 RX ADMIN — FAMOTIDINE 20 MILLIGRAM(S): 10 INJECTION INTRAVENOUS at 11:30

## 2017-05-24 RX ADMIN — BUDESONIDE AND FORMOTEROL FUMARATE DIHYDRATE 2 PUFF(S): 160; 4.5 AEROSOL RESPIRATORY (INHALATION) at 08:56

## 2017-05-24 RX ADMIN — AMLODIPINE BESYLATE 2.5 MILLIGRAM(S): 2.5 TABLET ORAL at 08:52

## 2017-05-24 NOTE — DISCHARGE NOTE ADULT - HOSPITAL COURSE
Patient admitted with COPD exacerbation with acute bronchitis. No evidence of pneumonia. She was treated with iv steroids, nebs and empiric abx. She improved rapidly. She is going to go home on po steroid taper, nebs, inhalers, po abx. She will f/u with Dr. Andrade within 1 week.

## 2017-05-24 NOTE — DISCHARGE NOTE ADULT - CARE PLAN
Principal Discharge DX:	COPD exacerbation  Goal:	.  Instructions for follow-up, activity and diet:	Finish course of steroids and antibiotics. Follow-up with Dr. Andrade within 1 week.  Secondary Diagnosis:	Essential hypertension  Instructions for follow-up, activity and diet:	Continue current medications

## 2017-05-24 NOTE — DISCHARGE NOTE ADULT - PATIENT PORTAL LINK FT
“You can access the [facility] Patient Portal, offered by [facility], by registering with the following website: [email or web address]”

## 2017-05-24 NOTE — DISCHARGE NOTE ADULT - MEDICATION SUMMARY - MEDICATIONS TO TAKE
I will START or STAY ON the medications listed below when I get home from the hospital:    predniSONE 10 mg oral tablet  -- 4 tab(s) once a day x 2 days  3 tab(s) once a day x 2 days  2 tab(s) once a day x 2 days  1 tab(s) once a day x 2 days  -- It is very important that you take or use this exactly as directed.  Do not skip doses or discontinue unless directed by your doctor.  Obtain medical advice before taking any non-prescription drugs as some may affect the action of this medication.  Take with food or milk.    -- Indication: For COPD exacerbation    simvastatin 10 mg oral tablet  -- 1 tab(s) by mouth once a day (at bedtime)  -- Indication: For Hyperlipidemia, unspecified hyperlipidemia type    doxycycline monohydrate 100 mg oral capsule  -- 1 cap(s) by mouth every 12 hours  -- Indication: For COPD exacerbation    zolpidem 10 mg oral tablet  -- 1 tab(s) by mouth once a day (at bedtime)  -- Indication: For Insomnia    Spiriva Respimat 2.5 mcg/inh inhalation aerosol  -- 2 puff(s) inhaled once a day  -- Indication: For Chronic obstructive pulmonary disease, unspecified COPD type    Symbicort 160 mcg-4.5 mcg/inh inhalation aerosol  -- 2 puff(s) inhaled 2 times a day  -- Indication: For Chronic obstructive pulmonary disease, unspecified COPD type    amLODIPine 2.5 mg oral tablet  -- 1 tab(s) by mouth every 24 hours  -- Indication: For Essential hypertension    famotidine 20 mg oral tablet  -- 1 tab(s) by mouth once a day  -- Indication: For Gastroesophageal reflux disease, esophagitis presence not specified    lactobacillus acidophilus oral capsule  -- 1 cap(s) by mouth 2 times a day  -- Indication: For Need for prophylactic measure

## 2017-05-24 NOTE — PROGRESS NOTE ADULT - PROBLEM SELECTOR PLAN 2
Patient with fever possibly due to pneumonia vs bronchitis.       Further management as per patient's clinical course.

## 2017-05-24 NOTE — PROGRESS NOTE ADULT - SUBJECTIVE AND OBJECTIVE BOX
PULMONARY/CRITICAL CARE      INTERVAL HPI/OVERNIGHT EVENTS: Much improved. Less sob. Ambulating. No fever.    72y FemaleHPI:  72-year-old female who has past medical history of COPD, hypertension, GERD, who has been having increasing shortness of breath for last few weeks.  Patient was treated as outpatient with oral steroids and nebulizer.  As she continued to have increasing shortness of breath, she was seen in Dr. Andrade's office today.  Patient was noted to have fever.  She was also noted to have COPD exacerbation.  Patient was advised to come to the emergency room for possible admission.    Patient denies any associated chest pain or chest pressure.  She denies any orthopnea or PND.  She denies any nausea or vomiting.    Patient states she has a cough that's productive of small amount of sputum.  She denies any dizziness or lightheadedness. Patient complains of being weak and tired. (22 May 2017 16:58)        PAST MEDICAL & SURGICAL HISTORY:  Gastroesophageal reflux disease, esophagitis presence not specified  Hyperlipidemia, unspecified hyperlipidemia type  Essential hypertension  Chronic obstructive pulmonary disease, unspecified COPD type  No significant past surgical history        ICU Vital Signs Last 24 Hrs  T(C): 36.4, Max: 37 (05-23 @ 10:03)  T(F): 97.6, Max: 98.6 (05-23 @ 10:03)  HR: 82 (82 - 106)  BP: 116/60 (116/60 - 163/72)  BP(mean): --  ABP: --  ABP(mean): --  RR: 18 (18 - 22)  SpO2: 92% (92% - 98%)    Qtts:   REVIEW OF SYSTEMS:    CONSTITUTIONAL: No fever, weight loss, or fatigue  EYES: No eye pain, visual disturbances, or discharge  ENMT:  No difficulty hearing, tinnitus, vertigo; No sinus or throat pain  NECK: No pain or stiffness  BREASTS: No pain, masses, or nipple discharge  RESPIRATORY: Has cough, wheezing,No chills or hemoptysis; Significant shortness of breath  CARDIOVASCULAR: No chest pain, palpitations, dizziness, or leg swelling  GASTROINTESTINAL: No abdominal or epigastric pain. No nausea, vomiting, or hematemesis; No diarrhea or constipation. No melena or hematochezia.  GENITOURINARY: No dysuria, frequency, hematuria, or incontinence  NEUROLOGICAL: No headaches, memory loss, loss of strength, numbness, or tremors  SKIN: No itching, burning, rashes, or lesions   LYMPH NODES: No enlarged glands  ENDOCRINE: No heat or cold intolerance; No hair loss  MUSCULOSKELETAL: No joint pain or swelling; No muscle, back, or extremity pain, no calf tenderness  PSYCHIATRIC: No depression, anxiety, mood swings, or difficulty sleeping  HEME/LYMPH: No easy bruising, or bleeding gums  ALLERGY AND IMMUNOLOGIC: No hives or eczema      PHYSICAL EXAM:    GENERAL: NAD, well-groomed, well-developed, NAD Thin female  HEAD:  Atraumatic, Normocephalic  EYES: EOMI, PERRLA, conjunctiva and sclera clear  ENMT: No tonsillar erythema, exudates, or enlargement; Moist mucous membranes, Good dentition, No lesions  NECK: Supple, No JVD, Normal thyroid  NERVOUS SYSTEM:  Alert & Oriented X3, Good concentration; Motor Strength 5/5 B/L upper and lower extremities  CHEST/LUNG: Decreased BS. bilaterally; No rales, rhonchi, wheezing, or rubs No change fremitus or percussion  HEART: Regular rate and rhythm; No murmurs, rubs, or gallops  ABDOMEN: Soft, Nontender, Nondistended; Bowel sounds present  EXTREMITIES:  2+ Peripheral Pulses, No clubbing, cyanosis, or edema  LYMPH: No lymphadenopathy noted  SKIN: No rashes or lesions      I&O's Summary    I & Os for current day (as of 24 May 2017 08:29)  =============================================  IN: 240 ml / OUT: 400 ml / NET: -160 ml              LABS:                        13.1   23.0  )-----------( 404      ( 24 May 2017 07:30 )             39.8     05-24    141  |  104  |  16  ----------------------------<  114<H>  3.8   |  30  |  0.86    Ca    9.4      24 May 2017 07:30  Phos  3.1     05-22  Mg     1.6     05-22    TPro  6.2  /  Alb  2.8<L>  /  TBili  0.4  /  DBili  x   /  AST  24  /  ALT  28  /  AlkPhos  86  05-22    PT/INR - ( 22 May 2017 17:44 )   PT: 13.0 sec;   INR: 1.19 ratio         PTT - ( 22 May 2017 17:44 )  PTT:30.1 sec  Urinalysis Basic - ( 22 May 2017 20:07 )    Color: Yellow / Appearance: Clear / S.015 / pH: x  Gluc: x / Ketone: Trace  / Bili: Negative / Urobili: Negative mg/dL   Blood: x / Protein: 30 mg/dL / Nitrite: Positive   Leuk Esterase: Small / RBC: 0-2 /HPF / WBC 0-2   Sq Epi: x / Non Sq Epi: Occasional / Bacteria: Occasional      ABG - ( 22 May 2017 18:11 )  pH: x     /  pCO2: 33    /  pO2: 54    / HCO3: 27    / Base Excess: 2.1   /  SaO2: 89          CT CHEST--BRONCHIECTASIS      vanco through     RADIOLOGY & ADDITIONAL STUDIES:      CRITICAL CARE TIME SPENT:
DIMPLE THAKUR is a 72yFemale , patient examined and chart reviewed. Patient seen and examined today being followed for possible pneumonia       INTERVAL HPI/ OVERNIGHT EVENTS: Events noted, feels much better, less SOB and less cough . Denies any fever or chills .     PAST MEDICAL & SURGICAL HISTORY:  Gastroesophageal reflux disease, esophagitis presence not specified  Hyperlipidemia, unspecified hyperlipidemia type  Essential hypertension  Chronic obstructive pulmonary disease, unspecified COPD type  No significant past surgical history      For details regarding the patient's social history, family history, and other miscellaneous elements, please refer the initial infectious diseases consultation and/or the admitting history and physical examination for this admission.      ROS:  CONSTITUTIONAL:  Negative fever or chills, feels well, good appetite  EYES:  Negative  blurry vision or double vision  CARDIOVASCULAR:  Negative for chest pain or palpitations  RESPIRATORY:  Positive for cough, NO wheezing, or SOB   GASTROINTESTINAL:  Negative for nausea, vomiting, diarrhea, constipation, or abdominal pain  GENITOURINARY:  Negative frequency, urgency or dysuria  NEUROLOGIC:  No headache, confusion, dizziness, lightheadedness  All other systems were reviewed and are negative         Current inpatient medications :    ANTIBIOTICS/RELEVANT:  lactobacillus acidophilus 1Tablet(s) Oral three times a day with meals  doxycycline hyclate Capsule 100milliGRAM(s) Oral every 12 hours  cefTRIAXone   IVPB  IV Intermittent   cefTRIAXone   IVPB 1Gram(s) IV Intermittent every 24 hours      ALBUTerol/ipratropium for Nebulization 3milliLiter(s) Nebulizer every 6 hours  methylPREDNISolone sodium succinate Injectable 40milliGRAM(s) IV Push every 8 hours  enoxaparin Injectable 30milliGRAM(s) SubCutaneous every 24 hours  acetaminophen   Tablet 650milliGRAM(s) Oral every 6 hours PRN  acetaminophen   Tablet. 650milliGRAM(s) Oral every 6 hours PRN  ALBUTerol/ipratropium for Nebulization 3milliLiter(s) Nebulizer every 4 hours PRN  amLODIPine   Tablet 2.5milliGRAM(s) Oral every 24 hours  simvastatin 10milliGRAM(s) Oral at bedtime  zolpidem 5milliGRAM(s) Oral at bedtime PRN  zolpidem 5milliGRAM(s) Oral at bedtime PRN  tiotropium 18 MICROgram(s) Capsule 1Capsule(s) Inhalation daily  buDESOnide 160 MICROgram(s)/formoterol 4.5 MICROgram(s) Inhaler 2Puff(s) Inhalation two times a day  famotidine    Tablet 20milliGRAM(s) Oral daily  aluminum hydroxide/magnesium hydroxide/simethicone Suspension 30milliLiter(s) Oral every 4 hours PRN      Objective:    I & Os for current day (as of  @ 08:17)  =============================================  IN: 240 ml / OUT: 400 ml / NET: -160 ml    T(C): 36.4, Max: 37 ( @ 10:03)  HR: 82 (82 - 106)  BP: 116/60 (116/60 - 163/72)  RR: 18 (18 - 22)  SpO2: 92% (92% - 98%)  Wt(kg): --      Physical Exam:  GEN: NAD, pleasant  HEENT: normocephalic and atraumatic.  EOMI. IGGY. Moist mucosa. Clear Posterior pharynx.  NECK: Supple. No carotid bruits.  No lymphadenopathy or thyromegaly.  LUNGS: coarse breath sounds on auscultation.  HEART: Regular rate and rhythm without murmur.  ABDOMEN: Soft, nontender, and nondistended.  Positive bowel sounds.  No hepatosplenomegaly was noted.  EXTREMITIES: Without any cyanosis, clubbing, rash, lesions or edema. 2 + peripheral pulses   NEUROLOGIC: Alert & oriented x 3 , No focal neurological deficits, DTR's  intact and symmetric    SKIN: No ulceration or induration present.      LABS:                          13.1   23.0  )-----------( 404      ( 24 May 2017 07:30 )             39.8           141  |  104  |  16  ----------------------------<  114<H>  3.8   |  30  |  0.86    Ca    9.4      24 May 2017 07:30  Phos  3.1       Mg     1.6         TPro  6.2  /  Alb  2.8<L>  /  TBili  0.4  /  DBili  x   /  AST  24  /  ALT  28  /  AlkPhos  86        PT/INR - ( 22 May 2017 17:44 )   PT: 13.0 sec;   INR: 1.19 ratio         PTT - ( 22 May 2017 17:44 )  PTT:30.1 sec  Urinalysis Basic - ( 22 May 2017 20:07 )    Color: Yellow / Appearance: Clear / S.015 / pH: x  Gluc: x / Ketone: Trace  / Bili: Negative / Urobili: Negative mg/dL   Blood: x / Protein: 30 mg/dL / Nitrite: Positive   Leuk Esterase: Small / RBC: 0-2 /HPF / WBC 0-2   Sq Epi: x / Non Sq Epi: Occasional / Bacteria: Occasional        ABG - ( 22 May 2017 18:11 )  pH: x     /  pCO2: 33    /  pO2: 54    / HCO3: 27    / Base Excess: 2.1   /  SaO2: 89            Culture - Sputum . (17 @ 16:57)    Gram Stain:   Numerous polymorphonuclear leukocytes per low power field  Few Squamous epithelial cells per low power field  Numerous Gram positive cocci in pairs per oil power field  Few Gram Variable Rods per oil power field    Specimen Source: .Sputum Sputum    Culture - Blood (17 @ 21:58)    Specimen Source: .Blood Blood    Culture Results:   No growth to date.    Procalcitonin, Serum (17 @ 11:58)    Procalcitonin, Serum: 0.09: PCT Interpretation      RADIOLOGY & ADDITIONAL STUDIES:  EXAM:  CT ANGIO CHEST (W)AW IC                          PROCEDURE DATE:  2017        INTERPRETATION:  Clinical information: Fever, shortness of breath,   leukocytosis.    Axial images obtained, coronal and sagittal images computer reformatted.   MIP images obtained. 95 cc of Omnipaque 300 intravenously administered, 5   cc discarded.    Normal contrast enhancement of pulmonary outflow tract, right main   pulmonary artery, left main pulmonary artery, visualized secondary   branchesof pulmonary arterial system.    No evidence of thoracic aortic aneurysm. No evidence of pericardial   effusion. Evidence of centrilobular emphysema. Pleural parenchymal   scarring, lung apices. Scattered bronchiectasis, both lungs.   Peribronchial thickening, left lower lung, right lower lung. No effusion.   No vascular congestion.    Central airway intact. No mediastinal lesions or hilar lesions identified.    Adrenal glands not enlarged. No acute osseous abnormalities. Calcified   intervertebral disc upper lumbar region. Disc degenerative disease mid   lumbar region. A calcification is visible in the region of the right   renal hilus, may be vascular. Correlate with renal sonography. Left renal   cyst  present.    IMPRESSION: No evidence of pulmonary embolism. See above report.        Problem/Plan - 1:  ·  Problem: Acute respiratory failure with hypoxia.  Plan:Plan: Patient with acute respiratory failure with hypoxemia, POA. improved clinically   Most likely due to COPD Exacerbation with bronchiectasis CTA chest negative for pneumonia or PE  Continue patient on Symbicort and Spiriva.  Will place patient on oxygen supplementation and monitor pulse ox.  Pulmonary consult noted   Further management as per patient's clinical course..     Problem/Plan - 2:  ·  Problem: Fever, unspecified fever cause.  Plan:Plan: Patient with leukocytosis and fever possibly due exacerbation of bronchiectasis with possible LRTI    Chest x-ray is unremarkable   will change to po antibiotics   Trend CBC, her leukocytosis may be reactive to steroids , taper steroids   DC planning as per pulmonary , cleared for dc by pulmonary     I have discussed the above plan of care with patient in detail. She expressed understanding of the  treatment plan . Risks, benefits and alternatives discussed in detail. I have asked if she has any questions or concerns and appropriately addressed them to the best of my ability .    > 35 minutes were spent in direct patient care reviewing notes, medications ,labs data/ imaging , discussion with multidisciplinary team.    Thank you for allowing me to participate in care of your patient .    Mackenzie Dorman MD  [phone]
PULMONARY/CRITICAL CARE      INTERVAL HPI/OVERNIGHT EVENTS:    72y FemaleHPI: Pt well known to me. Improved today, less sob. Some cough prod. yellow sputum.  72-year-old female who has past medical history of COPD, hypertension, GERD, who has been having increasing shortness of breath for last few weeks.  Patient was treated as outpatient with oral steroids and nebulizer.  As she continued to have increasing shortness of breath, she was seen in Dr. Andrade's office today.  Patient was noted to have fever.  She was also noted to have COPD exacerbation.  Patient was advised to come to the emergency room for possible admission.    Patient denies any associated chest pain or chest pressure.  She denies any orthopnea or PND.  She denies any nausea or vomiting.    Patient states she has a cough that's productive of small amount of sputum.  She denies any dizziness or lightheadedness. Patient complains of being weak and tired. (22 May 2017 16:58)        PAST MEDICAL & SURGICAL HISTORY:  Gastroesophageal reflux disease, esophagitis presence not specified  Hyperlipidemia, unspecified hyperlipidemia type  Essential hypertension  Chronic obstructive pulmonary disease, unspecified COPD type  No significant past surgical history  HX c.diff in past        ICU Vital Signs Last 24 Hrs  T(C): 36.4, Max: 37.6 (05-22 @ 16:42)  T(F): 97.5, Max: 99.6 (05-22 @ 16:42)  HR: 71 (71 - 115)  BP: 117/68 (117/68 - 174/78)  BP(mean): --  ABP: --  ABP(mean): --  RR: 22 (20 - 24)  SpO2: 98% (93% - 98%)    Qtts:     I&O's Summary    I & Os for current day (as of 23 May 2017 07:40)  =============================================  IN: 1000 ml / OUT: 600 ml / NET: 400 ml          REVIEW OF SYSTEMS:    CONSTITUTIONAL: No fever, weight loss, or fatigue  EYES: No eye pain, visual disturbances, or discharge  ENMT:  No difficulty hearing, tinnitus, vertigo; No sinus or throat pain  NECK: No pain or stiffness  BREASTS: No pain, masses, or nipple discharge  RESPIRATORY: Has cough, wheezing,No chills or hemoptysis; Significant shortness of breath  CARDIOVASCULAR: No chest pain, palpitations, dizziness, or leg swelling  GASTROINTESTINAL: No abdominal or epigastric pain. No nausea, vomiting, or hematemesis; No diarrhea or constipation. No melena or hematochezia.  GENITOURINARY: No dysuria, frequency, hematuria, or incontinence  NEUROLOGICAL: No headaches, memory loss, loss of strength, numbness, or tremors  SKIN: No itching, burning, rashes, or lesions   LYMPH NODES: No enlarged glands  ENDOCRINE: No heat or cold intolerance; No hair loss  MUSCULOSKELETAL: No joint pain or swelling; No muscle, back, or extremity pain, no calf tenderness  PSYCHIATRIC: No depression, anxiety, mood swings, or difficulty sleeping  HEME/LYMPH: No easy bruising, or bleeding gums  ALLERGY AND IMMUNOLOGIC: No hives or eczema      PHYSICAL EXAM:    GENERAL: NAD, well-groomed, well-developed, NAD Thin female  HEAD:  Atraumatic, Normocephalic  EYES: EOMI, PERRLA, conjunctiva and sclera clear  ENMT: No tonsillar erythema, exudates, or enlargement; Moist mucous membranes, Good dentition, No lesions  NECK: Supple, No JVD, Normal thyroid  NERVOUS SYSTEM:  Alert & Oriented X3, Good concentration; Motor Strength 5/5 B/L upper and lower extremities  CHEST/LUNG: Decreased BS. bilaterally; No rales, rhonchi, wheezing, or rubs No change fremitus or percussion  HEART: Regular rate and rhythm; No murmurs, rubs, or gallops  ABDOMEN: Soft, Nontender, Nondistended; Bowel sounds present  EXTREMITIES:  2+ Peripheral Pulses, No clubbing, cyanosis, or edema  LYMPH: No lymphadenopathy noted  SKIN: No rashes or lesions        LABS:                        14.0   22.2  )-----------( 353      ( 22 May 2017 17:44 )             42.5     -    135  |  97  |  17  ----------------------------<  89  3.8   |  30  |  0.81    Ca    9.3      22 May 2017 17:44  Phos  3.1     -  Mg     1.6         TPro  6.2  /  Alb  2.8<L>  /  TBili  0.4  /  DBili  x   /  AST  24  /  ALT  28  /  AlkPhos  86  05-22    PT/INR - ( 22 May 2017 17:44 )   PT: 13.0 sec;   INR: 1.19 ratio         PTT - ( 22 May 2017 17:44 )  PTT:30.1 sec  Urinalysis Basic - ( 22 May 2017 20:07 )    Color: Yellow / Appearance: Clear / S.015 / pH: x  Gluc: x / Ketone: Trace  / Bili: Negative / Urobili: Negative mg/dL   Blood: x / Protein: 30 mg/dL / Nitrite: Positive   Leuk Esterase: Small / RBC: 0-2 /HPF / WBC 0-2   Sq Epi: x / Non Sq Epi: Occasional / Bacteria: Occasional      ABG - ( 22 May 2017 18:11 )  pH: x     /  pCO2: 33    /  pO2: 54    / HCO3: 27    / Base Excess: 2.1   /  SaO2: 89                vanco through     RADIOLOGY & ADDITIONAL STUDIES:      CRITICAL CARE TIME SPENT:
Patient is a 72y old  Female who presents with a chief complaint of shortness of breath x 1 - 2 weeks, chronic cough for years (22 May 2017 19:17)    HPI:  72-year-old female who has past medical history of COPD, hypertension, GERD, who has been having increasing shortness of breath for last few weeks.  Patient was treated as outpatient with oral steroids and nebulizer.  As she continued to have increasing shortness of breath, she was seen in Dr. Andrade's office today.  Patient was noted to have fever.  She was also noted to have COPD exacerbation.  Patient was advised to come to the emergency room for possible admission.    Patient denies any associated chest pain or chest pressure.  She denies any orthopnea or PND.  She denies any nausea or vomiting.    Patient states she has a cough that's productive of small amount of sputum.  She denies any dizziness or lightheadedness. Patient complains of being weak and tired. (22 May 2017 16:58)    INTERVAL HPI/OVERNIGHT EVENTS:  Chart reviewed, notes reviewed.   Patient seen and examined.  Being followed by following specialists: Pulmonary, ID    Consultant(s) Notes Reviewed:  [X] Yes    Care Discussed with Consultants/Other Providers: [X] Yes    REVIEW OF SYSTEMS:  CONSTITUTIONAL: No fever, weight loss, or fatigue  EYES: No eye pain, visual disturbances, or discharge  ENMT:  No difficulty hearing, tinnitus, vertigo; No sinus or throat pain  NECK: No pain or stiffness  BREASTS: No pain, masses, or nipple discharge  RESPIRATORY: + cough, + wheezing, + shortness of breath --> Improving.   CARDIOVASCULAR: No chest pain, palpitations, dizziness, or leg swelling  GASTROINTESTINAL: No abdominal or epigastric pain. No nausea, vomiting, or hematemesis; No diarrhea or constipation. No melena or hematochezia.  GENITOURINARY: No dysuria, frequency, hematuria, or incontinence  NEUROLOGICAL: No headaches, memory loss, loss of strength, numbness, or tremors  SKIN: No itching, burning, rashes, or lesions   LYMPH NODES: No enlarged glands  ENDOCRINE: No heat or cold intolerance; No hair loss; No polydipsia or polyuria  MUSCULOSKELETAL: No joint pain or swelling; No muscle, back, or extremity pain  PSYCHIATRIC: No depression, anxiety, mood swings, or difficulty sleeping  HEME/LYMPH: No easy bruising, or bleeding gums  ALLERGY AND IMMUNOLOGIC: No hives or eczema    Allergies    amoxicillin (Unknown)  Augmentin (Unknown)  Bactrim (Unknown)  Cipro (Unknown)  Levaquin (Unknown)  tetracycline (Unknown)    Intolerances      MEDICATIONS  (STANDING):  ALBUTerol/ipratropium for Nebulization 3milliLiter(s) Nebulizer every 6 hours  methylPREDNISolone sodium succinate Injectable 40milliGRAM(s) IV Push every 8 hours  lactobacillus acidophilus 1Tablet(s) Oral three times a day with meals  enoxaparin Injectable 30milliGRAM(s) SubCutaneous every 24 hours  amLODIPine   Tablet 2.5milliGRAM(s) Oral every 24 hours  simvastatin 10milliGRAM(s) Oral at bedtime  tiotropium 18 MICROgram(s) Capsule 1Capsule(s) Inhalation daily  buDESOnide 160 MICROgram(s)/formoterol 4.5 MICROgram(s) Inhaler 2Puff(s) Inhalation two times a day  famotidine    Tablet 20milliGRAM(s) Oral daily  doxycycline hyclate Capsule 100milliGRAM(s) Oral every 12 hours  cefTRIAXone   IVPB  IV Intermittent     MEDICATIONS  (PRN):  acetaminophen   Tablet 650milliGRAM(s) Oral every 6 hours PRN For Temp greater than 38 C (100.4 F)  acetaminophen   Tablet. 650milliGRAM(s) Oral every 6 hours PRN Mild Pain (1 - 3)  ALBUTerol/ipratropium for Nebulization 3milliLiter(s) Nebulizer every 4 hours PRN Shortness of Breath and/or Wheezing  zolpidem 5milliGRAM(s) Oral at bedtime PRN Insomnia  zolpidem 5milliGRAM(s) Oral at bedtime PRN Insomnia  aluminum hydroxide/magnesium hydroxide/simethicone Suspension 30milliLiter(s) Oral every 4 hours PRN Dyspepsia    Vital Signs Last 24 Hrs  T(C): 36.7, Max: 37.6 (05-22 @ 16:42)  T(F): 98, Max: 99.6 (05-22 @ 16:42)  HR: 92 (71 - 115)  BP: 118/82 (117/68 - 174/78)  BP(mean): --  RR: 20 (20 - 24)  SpO2: 98% (93% - 98%)    PHYSICAL EXAM:  GENERAL: NAD, well-groomed, well-developed  HEAD:  Atraumatic, Normocephalic  EYES: EOMI, PERRLA, conjunctiva and sclera clear  ENMT: No tonsillar erythema, exudates, or enlargement; Moist mucous membranes, Good dentition, No lesions  NECK: Supple, No JVD, Normal thyroid  CHEST/LUNG: Clear to auscultation bilaterally; No rales, rhonchi, wheezing, or rubs  HEART: Regular rate and rhythm; No murmurs, rubs, or gallops  ABDOMEN: Soft, Nontender, Nondistended; Bowel sounds present  EXTREMITIES:  2+ Peripheral Pulses, No clubbing, cyanosis, or edema  MS: No joint swelling or deformity.   LYMPH: No lymphadenopathy noted  SKIN: No rashes or lesions  NERVOUS SYSTEM:  Motor Strength 4/5 B/L upper and lower extremities; DTRs 2+ intact and symmetric  PSYCH:  Alert & Oriented X3, Good concentration.    LABS:                        14.0   22.2  )-----------( 353      ( 22 May 2017 17:44 )             42.5         135  |  97  |  17  ----------------------------<  89  3.8   |  30  |  0.81    Ca    9.3      22 May 2017 17:44  Phos  3.1     -  Mg     1.6         TPro  6.2  /  Alb  2.8<L>  /  TBili  0.4  /  DBili  x   /  AST  24  /  ALT  28  /  AlkPhos  86  -    CAPILLARY BLOOD GLUCOSE    PT/INR - ( 22 May 2017 17:44 )   PT: 13.0 sec;   INR: 1.19 ratio         PTT - ( 22 May 2017 17:44 )  PTT:30.1 sec     [id number] 5.6     Chol 149 LDL 99 HDL 32<L> Trig 88    Urinalysis Basic - ( 22 May 2017 20:07 )    Color: Yellow / Appearance: Clear / S.015 / pH: x  Gluc: x / Ketone: Trace  / Bili: Negative / Urobili: Negative mg/dL   Blood: x / Protein: 30 mg/dL / Nitrite: Positive   Leuk Esterase: Small / RBC: 0-2 /HPF / WBC 0-2   Sq Epi: x / Non Sq Epi: Occasional / Bacteria: Occasional        RADIOLOGY TEST: (IMAGES REVIEWED BY ME)    EXAM:  CT ANGIO CHEST (W)AW IC                          PROCEDURE DATE:  2017      INTERPRETATION:  Clinical information: Fever, shortness of breath,   leukocytosis.    Axial images obtained, coronal and sagittal images computer reformatted.   MIP images obtained. 95 cc of Omnipaque 300 intravenously administered, 5   cc discarded.    Normal contrast enhancement of pulmonary outflow tract, right main   pulmonary artery, left main pulmonary artery, visualized secondary   branchesof pulmonary arterial system.    No evidence of thoracic aortic aneurysm. No evidence of pericardial   effusion. Evidence of centrilobular emphysema. Pleural parenchymal   scarring, lung apices. Scattered bronchiectasis, both lungs.   Peribronchial thickening, left lower lung, right lower lung. No effusion.   No vascular congestion.    Central airway intact. No mediastinal lesions or hilar lesions identified.    Adrenal glands not enlarged. No acute osseous abnormalities. Calcified   intervertebral disc upper lumbar region. Disc degenerative disease mid   lumbar region. A calcification is visible in the region of the right   renal hilus, may be vascular. Correlate with renal sonography. Left renal   cyst  present.    IMPRESSION: No evidence of pulmonary embolism. See above report.      Imaging Personally Reviewed:  [X] YES        HEALTH ISSUES - PROBLEM Dx:  Need for prophylactic measure: Need for prophylactic measure  Gastroesophageal reflux disease, esophagitis presence not specified: Gastroesophageal reflux disease, esophagitis presence not specified  Essential hypertension: Essential hypertension  COPD exacerbation: COPD exacerbation  Fever, unspecified fever cause: Fever, unspecified fever cause  Acute respiratory failure with hypoxia: Acute respiratory failure with hypoxia

## 2017-05-24 NOTE — DISCHARGE NOTE ADULT - CARE PROVIDER_API CALL
Brain Andrade), Critical Care Medicine; Internal Medicine; Pulmonary Disease  [address] [place] [address]  Phone: [phone]  Fax: [phone]

## 2017-05-24 NOTE — DISCHARGE NOTE ADULT - PLAN OF CARE
. Finish course of steroids and antibiotics. Follow-up with Dr. Andrade within 1 week. Continue current medications

## 2017-05-24 NOTE — PROGRESS NOTE ADULT - PROBLEM SELECTOR PLAN 1
Patient with acute respiratory failure with hypoxemia, POA.  Most likely due to COPD Exacerbation +/- Pneumonia.   Will place patient on oxygen supplementation and monitor pulse ox.  May d/c pt on tapering dose steroids, oral antibiotics as per Dr. Dorman  Steroids.  Pulmonary consult called.   Further management as per patient's clinical course.

## 2017-05-24 NOTE — DISCHARGE NOTE ADULT - MEDICATION SUMMARY - MEDICATIONS TO STOP TAKING
I will STOP taking the medications listed below when I get home from the hospital:    hydroCHLOROthiazide 12.5 mg oral tablet  -- 1 tab(s) by mouth once a day

## 2017-05-25 LAB
CULTURE RESULTS: SIGNIFICANT CHANGE UP
SPECIMEN SOURCE: SIGNIFICANT CHANGE UP

## 2017-05-27 LAB
CULTURE RESULTS: SIGNIFICANT CHANGE UP
CULTURE RESULTS: SIGNIFICANT CHANGE UP
SPECIMEN SOURCE: SIGNIFICANT CHANGE UP
SPECIMEN SOURCE: SIGNIFICANT CHANGE UP

## 2018-08-10 ENCOUNTER — EMERGENCY (EMERGENCY)
Facility: HOSPITAL | Age: 73
LOS: 1 days | End: 2018-08-10
Attending: EMERGENCY MEDICINE | Admitting: EMERGENCY MEDICINE
Payer: MEDICARE

## 2018-08-10 VITALS — SYSTOLIC BLOOD PRESSURE: 44 MMHG | RESPIRATION RATE: 12 BRPM | HEART RATE: 52 BPM | DIASTOLIC BLOOD PRESSURE: 22 MMHG

## 2018-08-10 VITALS — HEIGHT: 65 IN | WEIGHT: 100.09 LBS

## 2018-08-10 DIAGNOSIS — I46.9 CARDIAC ARREST, CAUSE UNSPECIFIED: ICD-10-CM

## 2018-08-10 LAB
ALBUMIN SERPL ELPH-MCNC: 1.5 G/DL — LOW (ref 3.3–5)
ALP SERPL-CCNC: 42 U/L — SIGNIFICANT CHANGE UP (ref 30–120)
ALT FLD-CCNC: 417 U/L DA — HIGH (ref 10–60)
ANION GAP SERPL CALC-SCNC: 27 MMOL/L — HIGH (ref 5–17)
APTT BLD: 56.6 SEC — HIGH (ref 27.5–37.4)
AST SERPL-CCNC: 1367 U/L — HIGH (ref 10–40)
BASOPHILS # BLD AUTO: 0 K/UL — SIGNIFICANT CHANGE UP (ref 0–0.2)
BASOPHILS NFR BLD AUTO: 0 % — SIGNIFICANT CHANGE UP (ref 0–2)
BILIRUB SERPL-MCNC: 0.4 MG/DL — SIGNIFICANT CHANGE UP (ref 0.2–1.2)
BUN SERPL-MCNC: 72 MG/DL — HIGH (ref 7–23)
CALCIUM SERPL-MCNC: 10.1 MG/DL — SIGNIFICANT CHANGE UP (ref 8.4–10.5)
CHLORIDE SERPL-SCNC: 106 MMOL/L — SIGNIFICANT CHANGE UP (ref 96–108)
CK MB BLD-MCNC: 0.6 % — SIGNIFICANT CHANGE UP (ref 0–3.5)
CK MB CFR SERPL CALC: 6.2 NG/ML — HIGH (ref 0–3.6)
CK SERPL-CCNC: 975 U/L — HIGH (ref 26–192)
CO2 SERPL-SCNC: 10 MMOL/L — CRITICAL LOW (ref 22–31)
CREAT SERPL-MCNC: 4.54 MG/DL — HIGH (ref 0.5–1.3)
EOSINOPHIL # BLD AUTO: 0 K/UL — SIGNIFICANT CHANGE UP (ref 0–0.5)
EOSINOPHIL NFR BLD AUTO: 0 % — SIGNIFICANT CHANGE UP (ref 0–6)
GLUCOSE SERPL-MCNC: 172 MG/DL — HIGH (ref 70–99)
HCT VFR BLD CALC: 31.1 % — LOW (ref 34.5–45)
HGB BLD-MCNC: 8.7 G/DL — LOW (ref 11.5–15.5)
INR BLD: 4.97 RATIO — HIGH (ref 0.88–1.16)
LYMPHOCYTES # BLD AUTO: 69 % — HIGH (ref 13–44)
LYMPHOCYTES # BLD AUTO: 8.2 K/UL — HIGH (ref 1–3.3)
MANUAL SMEAR VERIFICATION: SIGNIFICANT CHANGE UP
MCHC RBC-ENTMCNC: 27.4 PG — SIGNIFICANT CHANGE UP (ref 27–34)
MCHC RBC-ENTMCNC: 28 GM/DL — LOW (ref 32–36)
MCV RBC AUTO: 97.8 FL — SIGNIFICANT CHANGE UP (ref 80–100)
METAMYELOCYTES # FLD: 3 % — HIGH (ref 0–0)
MONOCYTES # BLD AUTO: 0.48 K/UL — SIGNIFICANT CHANGE UP (ref 0–0.9)
MONOCYTES NFR BLD AUTO: 4 % — SIGNIFICANT CHANGE UP (ref 2–14)
MYELOCYTES NFR BLD: 1 % — HIGH (ref 0–0)
NEUTROPHILS # BLD AUTO: 2.73 K/UL — SIGNIFICANT CHANGE UP (ref 1.8–7.4)
NEUTROPHILS NFR BLD AUTO: 20 % — LOW (ref 43–77)
NEUTS BAND # BLD: 3 % — SIGNIFICANT CHANGE UP (ref 0–8)
NRBC # BLD: 0 — SIGNIFICANT CHANGE UP
NRBC # BLD: SIGNIFICANT CHANGE UP /100 WBCS (ref 0–0)
PLAT MORPH BLD: NORMAL — SIGNIFICANT CHANGE UP
PLATELET # BLD AUTO: 107 K/UL — LOW (ref 150–400)
POTASSIUM SERPL-MCNC: 8.7 MMOL/L — CRITICAL HIGH (ref 3.5–5.3)
POTASSIUM SERPL-SCNC: 8.7 MMOL/L — CRITICAL HIGH (ref 3.5–5.3)
PROT SERPL-MCNC: 3.7 G/DL — LOW (ref 6–8.3)
PROTHROM AB SERPL-ACNC: 56 SEC — HIGH (ref 9.8–12.7)
RBC # BLD: 3.18 M/UL — LOW (ref 3.8–5.2)
RBC # FLD: 15.8 % — HIGH (ref 10.3–14.5)
RBC BLD AUTO: ABNORMAL
SODIUM SERPL-SCNC: 143 MMOL/L — SIGNIFICANT CHANGE UP (ref 135–145)
TROPONIN I SERPL-MCNC: 0.06 NG/ML — SIGNIFICANT CHANGE UP (ref 0.02–0.06)
WBC # BLD: 11.89 K/UL — HIGH (ref 3.8–10.5)
WBC # FLD AUTO: 11.89 K/UL — HIGH (ref 3.8–10.5)

## 2018-08-10 PROCEDURE — 80053 COMPREHEN METABOLIC PANEL: CPT

## 2018-08-10 PROCEDURE — 71045 X-RAY EXAM CHEST 1 VIEW: CPT | Mod: 26

## 2018-08-10 PROCEDURE — 84484 ASSAY OF TROPONIN QUANT: CPT

## 2018-08-10 PROCEDURE — 94002 VENT MGMT INPAT INIT DAY: CPT

## 2018-08-10 PROCEDURE — 82553 CREATINE MB FRACTION: CPT

## 2018-08-10 PROCEDURE — C1751: CPT

## 2018-08-10 PROCEDURE — 85027 COMPLETE CBC AUTOMATED: CPT

## 2018-08-10 PROCEDURE — 36556 INSERT NON-TUNNEL CV CATH: CPT

## 2018-08-10 PROCEDURE — 31500 INSERT EMERGENCY AIRWAY: CPT

## 2018-08-10 PROCEDURE — 99291 CRITICAL CARE FIRST HOUR: CPT | Mod: 25

## 2018-08-10 PROCEDURE — 92950 HEART/LUNG RESUSCITATION CPR: CPT

## 2018-08-10 PROCEDURE — 85730 THROMBOPLASTIN TIME PARTIAL: CPT

## 2018-08-10 PROCEDURE — 36415 COLL VENOUS BLD VENIPUNCTURE: CPT

## 2018-08-10 PROCEDURE — 71045 X-RAY EXAM CHEST 1 VIEW: CPT

## 2018-08-10 PROCEDURE — 85610 PROTHROMBIN TIME: CPT

## 2018-08-10 PROCEDURE — 94799 UNLISTED PULMONARY SVC/PX: CPT

## 2018-08-10 PROCEDURE — 82550 ASSAY OF CK (CPK): CPT

## 2018-08-10 PROCEDURE — 99285 EMERGENCY DEPT VISIT HI MDM: CPT | Mod: 25

## 2018-08-10 PROCEDURE — 99291 CRITICAL CARE FIRST HOUR: CPT

## 2018-08-10 RX ORDER — SODIUM CHLORIDE 9 MG/ML
1000 INJECTION INTRAMUSCULAR; INTRAVENOUS; SUBCUTANEOUS ONCE
Qty: 0 | Refills: 0 | Status: COMPLETED | OUTPATIENT
Start: 2018-08-10 | End: 2018-08-10

## 2018-08-10 RX ORDER — NOREPINEPHRINE BITARTRATE/D5W 8 MG/250ML
0.1 PLASTIC BAG, INJECTION (ML) INTRAVENOUS
Qty: 8 | Refills: 0 | Status: DISCONTINUED | OUTPATIENT
Start: 2018-08-10 | End: 2018-08-14

## 2018-08-10 RX ORDER — SODIUM CHLORIDE 9 MG/ML
3 INJECTION INTRAMUSCULAR; INTRAVENOUS; SUBCUTANEOUS ONCE
Qty: 0 | Refills: 0 | Status: COMPLETED | OUTPATIENT
Start: 2018-08-10 | End: 2018-08-10

## 2018-08-10 RX ADMIN — SODIUM CHLORIDE 1000 MILLILITER(S): 9 INJECTION INTRAMUSCULAR; INTRAVENOUS; SUBCUTANEOUS at 17:10

## 2018-08-10 RX ADMIN — SODIUM CHLORIDE 1000 MILLILITER(S): 9 INJECTION INTRAMUSCULAR; INTRAVENOUS; SUBCUTANEOUS at 17:45

## 2018-08-10 RX ADMIN — SODIUM CHLORIDE 3 MILLILITER(S): 9 INJECTION INTRAMUSCULAR; INTRAVENOUS; SUBCUTANEOUS at 17:30

## 2018-08-10 NOTE — ED PROVIDER NOTE - NOTES
Spoke with Dr. Thapa for consult, states he wants all results prior to seeing patient. will have Dr. Camargo see patient.

## 2018-08-10 NOTE — ED ADULT NURSE REASSESSMENT NOTE - NS ED NURSE REASSESS COMMENT FT1
1825 CM Vfib, Code called at 1820. Team at bedside. Pt defibrillated. ROSC at 1822. Pt monitored closely.

## 2018-08-10 NOTE — ED ADULT TRIAGE NOTE - CHIEF COMPLAINT QUOTE
73 yr. old female witnessed collapse at home.  Pt. arrived in cardiac arrest. Pt. intubated in field by EMS with ETT #6.  Upon arrival pt. asystole.  IV access right arm AC # 20 angiocatheter.

## 2018-08-10 NOTE — CONSULT NOTE ADULT - PROBLEM SELECTOR RECOMMENDATION 9
pt is dying  spoke with   discussed anoxic brain injury after 39 minutes of card arrest  pt  wishes for all measures  central line inserted emergently  pt arrested 3rd time   coded for approx 4 - 5 mins third time  pt  and pronounced

## 2018-08-10 NOTE — ED ADULT NURSE REASSESSMENT NOTE - NS ED NURSE REASSESS COMMENT FT1
1830 Dr Camargo at bedside. Pt admitted to SPCU, bed pending. Pt remains unresponsive, cyanotic. ETT to Vent. Monitored closely.

## 2018-08-10 NOTE — ED ADULT NURSE NOTE - OBJECTIVE STATEMENT
Brought in by [facility] in cardio-pulmonary arrest, intubated at the scene, CPR in progress on arrival. ROSC at 1716, with weak, thready pulse.  HX COPD, non compliant.

## 2018-08-10 NOTE — ED ADULT NURSE NOTE - FINAL NURSING ELECTRONIC SIGNATURE
Called patient. Gave her test results per Dr. Hutton. Patient verbalized understanding   10-Aug-2018 19:22

## 2018-08-10 NOTE — CONSULT NOTE ADULT - SUBJECTIVE AND OBJECTIVE BOX
Date/Time Patient Seen:  		  Referring MD:   Data Reviewed	       Patient is a 73y old  Female who presents with a chief complaint of     Subjective/HPI    seen and examined    card arrest    extensive medical hx    39 minutes under cardi arrest    ER provider note reviewed      ED Provider Note [Charted Location: [facility]] [Authored: 10-Aug-2018 17:17]- for Visit: [id number], Complete, Revised, Signed in Full, General    HISTORY OF PRESENT ILLNESS:    High Risk Travel:  International Travel? Unable to Assess(1)    · Chief Complaint: The patient is a 73y Female complaining of  · Unable to Obtain: Severe Illness/Injury  · Details: cardiac arrest  · HPI Objective Statement: 72 y/o F pt w/ PMHx COPD, HTN, HLD, GERD presents to the ED BIBA in cardiac arrest. As per EMS, they have performed ACLS on pt for 40 minutes, 4 Epi's and 1 D50 given, pt in asystole the entire time. No further hx available.    PAST MEDICAL/SURGICAL/FAMILY/SOCIAL HISTORY:    Past Medical History:  Chronic obstructive pulmonary disease, unspecified COPD type    Essential hypertension    Gastroesophageal reflux disease, esophagitis presence not specified    Hyperlipidemia, unspecified hyperlipidemia type.     Tobacco Usage:  · Tobacco Usage	Unknown if ever smoked    · Attestation Comment: I have reviewed and confirmed nurses' notes for patient's medications, allergies, medical history, and surgical history.    ALLERGIES AND HOME MEDICATIONS:   Allergies:        Allergies:  	amoxicillin: Drug, Unknown  	Augmentin: Drug, Unknown  	Bactrim: Drug, Unknown  	Cipro: Drug, Unknown  	Levaquin: Drug, Unknown  	tetracycline: Drug, Unknown       PAST MEDICAL & SURGICAL HISTORY:  Gastroesophageal reflux disease, esophagitis presence not specified  Hyperlipidemia, unspecified hyperlipidemia type  Essential hypertension  Chronic obstructive pulmonary disease, unspecified COPD type  No significant past surgical history        Medication list         MEDICATIONS  (STANDING):  norepinephrine Infusion 0.1 MICROgram(s)/kG/Min (8.512 mL/Hr) IV Continuous <Continuous>  sodium chloride 0.9% Bolus 1000 milliLiter(s) IV Bolus once  sodium chloride 0.9% lock flush 3 milliLiter(s) IV Push once    MEDICATIONS  (PRN):         Vitals log        ICU Vital Signs Last 24 Hrs  T(C): 34.9 (10 Aug 2018 18:00), Max: 34.9 (10 Aug 2018 18:00)  T(F): 94.8 (10 Aug 2018 18:00), Max: 94.8 (10 Aug 2018 18:00)  HR: 52 (10 Aug 2018 18:10) (0 - 127)  BP: 44/22 (10 Aug 2018 18:10) (0/0 - 82/52)  BP(mean): --  ABP: --  ABP(mean): --  RR: 12 (10 Aug 2018 18:10) (0 - 12)  SpO2: --       Mode: AC/ CMV (Assist Control/ Continuous Mandatory Ventilation)  RR (machine): 12  TV (machine): 400  FiO2: 100  PEEP: 5  ITime: 0.1  MAP: 10  PIP: 30      Input and Output:  I&O's Detail      Lab Data                        8.7    11.89 )-----------( 107      ( 10 Aug 2018 18:35 )             31.1                   Review of Systems	      Objective     Physical Examination    heart s1s2  lung dec BS    abd dec BS    mottled skin        Pertinent Lab findings & Imaging      Allen:  NO   Adequate UO     I&O's Detail           Discussed with:     Cultures:	        Radiology

## 2018-08-10 NOTE — ED ADULT NURSE REASSESSMENT NOTE - NS ED NURSE REASSESS COMMENT FT1
1800 CM RSR Rate 70's. Pulse weak. Pt remains unresponsive. ETT to Vent Settings at  FIO2 100% AC 12 Peep 5. Results of labs pending. Monitored closely

## 2018-08-10 NOTE — ED ADULT NURSE REASSESSMENT NOTE - NS ED NURSE REASSESS COMMENT FT1
1850 Right jugular triple lumen line inserted and completed at 1845 by MD Camargo. CM Asystole at 1850. Code called. Team at bedside.

## 2018-08-10 NOTE — ED PROVIDER NOTE - OBJECTIVE STATEMENT
72 y/o F pt w/ PMHx COPD, HTN, HLD, GERD presents to the ED BIBA in cardiac arrest. As per EMS, they have performed ACLS on pt for 40 minutes, 4 Epi's and 1 D50 given, pt in asystole the entire time. No further hx available.

## 2018-08-10 NOTE — ED PROVIDER NOTE - PROGRESS NOTE DETAILS
pt had vfib arrest, +rosc after defib x 1 and 1 epi pt had another cardiac arrest, unsuccessful resusitation, time of death 1857

## 2018-08-11 PROBLEM — I10 ESSENTIAL (PRIMARY) HYPERTENSION: Chronic | Status: ACTIVE | Noted: 2017-05-22

## 2018-08-11 PROBLEM — E78.5 HYPERLIPIDEMIA, UNSPECIFIED: Chronic | Status: ACTIVE | Noted: 2017-05-22

## 2018-08-11 PROBLEM — K21.9 GASTRO-ESOPHAGEAL REFLUX DISEASE WITHOUT ESOPHAGITIS: Chronic | Status: ACTIVE | Noted: 2017-05-22

## 2018-08-11 PROBLEM — J44.9 CHRONIC OBSTRUCTIVE PULMONARY DISEASE, UNSPECIFIED: Chronic | Status: ACTIVE | Noted: 2017-05-22

## 2019-03-08 NOTE — INPATIENT CERTIFICATION FOR MEDICARE PATIENTS - THE SEVERITY OF SIGNS/SYMPTOMS. (SEE ED/ADMIT DOCUMENTS)
Please send the letter to the patient with the following.         Here are your results for your recent lab which was normal. Please call or message me if you have questions or concerns.     
1. The severity of signs/symptoms.(See ED/admit documents)

## 2021-03-16 NOTE — DISCHARGE NOTE ADULT - IF YOU ARE A SMOKER, IT IS IMPORTANT FOR YOUR HEALTH TO STOP SMOKING. PLEASE BE AWARE THAT SECOND HAND SMOKE IS ALSO HARMFUL.
Routing refill request to provider for review/approval because:  Camilla given x1 and patient did not follow up, please advise  Peggy Carpenter RN           
Statement Selected

## 2021-07-12 NOTE — PROCEDURE NOTE - NSTIMEOUT_GEN_A_CORE
Patient's first and last name, , procedure, and correct site confirmed prior to the start of procedure. [FreeTextEntry9] : See HPI [de-identified] : Left wrist ganglion cyst